# Patient Record
Sex: FEMALE | Race: WHITE | NOT HISPANIC OR LATINO | Employment: OTHER | URBAN - METROPOLITAN AREA
[De-identification: names, ages, dates, MRNs, and addresses within clinical notes are randomized per-mention and may not be internally consistent; named-entity substitution may affect disease eponyms.]

---

## 2018-07-06 LAB — MAMMOGRAM: NORMAL

## 2018-07-11 ENCOUNTER — TRANSFERRED RECORDS (OUTPATIENT)
Dept: HEALTH INFORMATION MANAGEMENT | Facility: CLINIC | Age: 63
End: 2018-07-11

## 2018-07-12 ENCOUNTER — TRANSFERRED RECORDS (OUTPATIENT)
Dept: HEALTH INFORMATION MANAGEMENT | Facility: CLINIC | Age: 63
End: 2018-07-12

## 2018-08-06 ENCOUNTER — TRANSFERRED RECORDS (OUTPATIENT)
Dept: HEALTH INFORMATION MANAGEMENT | Facility: CLINIC | Age: 63
End: 2018-08-06

## 2018-08-13 ENCOUNTER — PRE VISIT (OUTPATIENT)
Dept: OTOLARYNGOLOGY | Facility: CLINIC | Age: 63
End: 2018-08-13

## 2018-08-13 NOTE — TELEPHONE ENCOUNTER
Date of appointment: 8/29/18   Diagnosis/reason for appointment: [facility]  Referring provider/facility:Dr. Daniel Saavedra  Who called: Patient    Recent Studies  Imaging:  Pathology:  Labs:  Previous chemo/radiation (if known):    Records requested from: [facility]-Spoke with Kimmy- She will have images pushed to PACS and forward request to pathology for slides    Records received from:    Additional information:   No

## 2018-08-16 LAB — COPATH REPORT: NORMAL

## 2018-08-16 PROCEDURE — 00000346 ZZHCL STATISTIC REVIEW OUTSIDE SLIDES TC 88321: Performed by: OTOLARYNGOLOGY

## 2018-08-20 ENCOUNTER — HEALTH MAINTENANCE LETTER (OUTPATIENT)
Age: 63
End: 2018-08-20

## 2018-08-29 ENCOUNTER — OFFICE VISIT (OUTPATIENT)
Dept: OTOLARYNGOLOGY | Facility: CLINIC | Age: 63
End: 2018-08-29
Payer: COMMERCIAL

## 2018-08-29 VITALS — BODY MASS INDEX: 36.73 KG/M2 | HEIGHT: 67 IN | WEIGHT: 234 LBS

## 2018-08-29 DIAGNOSIS — E07.9 THYROID MASS: Primary | ICD-10-CM

## 2018-08-29 RX ORDER — SENNOSIDES 8.6 MG
650 CAPSULE ORAL
COMMUNITY
End: 2018-10-02

## 2018-08-29 RX ORDER — LANOLIN ALCOHOL/MO/W.PET/CERES
1000 CREAM (GRAM) TOPICAL
COMMUNITY
Start: 2016-03-21

## 2018-08-29 RX ORDER — GABAPENTIN 600 MG/1
TABLET ORAL
COMMUNITY
Start: 2018-03-08 | End: 2020-04-15

## 2018-08-29 RX ORDER — IPRATROPIUM BROMIDE 42 UG/1
2 SPRAY, METERED NASAL
COMMUNITY
Start: 2017-12-13 | End: 2020-05-19

## 2018-08-29 RX ORDER — ROPINIROLE 0.25 MG/1
0.25 TABLET, FILM COATED ORAL AT BEDTIME
COMMUNITY

## 2018-08-29 RX ORDER — FLUTICASONE PROPIONATE 50 MCG
SPRAY, SUSPENSION (ML) NASAL
Refills: 11 | COMMUNITY
Start: 2018-03-09

## 2018-08-29 RX ORDER — DULOXETIN HYDROCHLORIDE 60 MG/1
60 CAPSULE, DELAYED RELEASE ORAL DAILY
COMMUNITY
Start: 2018-04-27

## 2018-08-29 RX ORDER — BACLOFEN 10 MG/1
TABLET ORAL PRN
Refills: 0 | COMMUNITY
Start: 2018-06-27 | End: 2019-07-23

## 2018-08-29 ASSESSMENT — PAIN SCALES - GENERAL: PAINLEVEL: NO PAIN (0)

## 2018-08-29 NOTE — NURSING NOTE
Chief Complaint   Patient presents with     Consult     thyroid referral by Dr. Quentin Nunes, EMT
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
## 2018-08-29 NOTE — PATIENT INSTRUCTIONS
Plan of care:  You can expect a call from Pamella to discuss your surgery  Clinic contact information:  1. To schedule an appointment or to speak to someone regarding your medical care, please call [phone], option #1  2. GLORIA Eden: [phone]  3. Surgery scheduling:      María Ballesteros: [phone]      Arcelia Luz: [phone]  4. Fax: [phone]  5. [phone]

## 2018-08-29 NOTE — MR AVS SNAPSHOT
After Visit Summary   2018    Joana Santana    MRN: [id number]           Patient Information     Date Of Birth          1955        Visit Information        Provider Department      2018 3:20 PM Destini Woodson MD [facility] Ear Nose and Throat        Today's Diagnoses     Thyroid mass    -  1      Care Instructions    Plan of care:  You can expect a call from Pamella to discuss your surgery  Clinic contact information:  1. To schedule an appointment or to speak to someone regarding your medical care, please call [phone], option #1  2. KareyRN: [phone]  3. Surgery scheduling:      María Ballesteros: [phone]      Arcelia Luz: [phone]  4. Fax: [phone]  5. [phone]            Follow-ups after your visit        Who to contact     Please call your clinic at [phone] to:    Ask questions about your health    Make or cancel appointments    Discuss your medicines    Learn about your test results    Speak to your doctor            Additional Information About Your Visit        [employer] Information     [employer] gives you secure access to your electronic health record. If you see a primary care provider, you can also send messages to your care team and make appointments. If you have questions, please call your primary care clinic.  If you do not have a primary care provider, please call [phone] and they will assist you.      [employer] is an electronic gateway that provides easy, online access to your medical records. With [employer], you can request a clinic appointment, read your test results, renew a prescription or communicate with your care team.     To access your existing account, please contact your [facility] or call [phone] for assistance.        Care EveryWhere ID     This is your Care EveryWhere ID. This could be used by other organizations to access your [facility] medical records  [id number]        Your  "Vitals Were     Height BMI (Body Mass Index)                1.702 m (5' 7\") 36.65 kg/m2           Blood Pressure from Last 3 Encounters:   No data found for BP    Weight from Last 3 Encounters:   08/29/18 106.1 kg (234 lb)              We Performed the Following     IMAGESTREAM RECORDING ORDER        Primary Care Provider    None Specified       No primary provider on file.        Equal Access to Services     [facility]: Hadii aad ku hadasho Soomaali, waaxda luqadaha, qaybta kaalmada adeegyada, sami gomez elíasn adecandi roa lajessyn . [facility] [phone].    ATENCIÓN: Si habla español, tiene a mckeon disposición servicios gratuitos de asistencia lingüística. Javierame al [phone].    We comply with applicable federal civil rights laws and [place] laws. We do not discriminate on the basis of race, color, national origin, age, disability, sex, sexual orientation, or gender identity.            Thank you!     Thank you for choosing [facility] EAR [facility]SE [facility] THROAT  for your care. Our goal is always to provide you with excellent care. Hearing back from our patients is one way we can continue to improve our services. Please take a few minutes to complete the written survey that you may receive in the mail after your visit with us. Thank you!             Your Updated Medication List - Protect others around you: Learn how to safely use, store and throw away your medicines at www.disposemymeds.org.          This list is accurate as of 8/29/18  4:48 PM.  Always use your most recent med list.                   Brand Name Dispense Instructions for use Diagnosis    acetaminophen 650 MG CR tablet    TYLENOL     650 mg        baclofen 10 MG tablet    LIORESAL          cholecalciferol 1000 UNIT tablet    vitamin D3     1,000 Units        cyanocobalamin 1000 MCG tablet    vitamin  B-12     1,000 mcg        DULoxetine 60 MG EC capsule    CYMBALTA     Pt taking 120 mg cymbalta hs        fluticasone 50 MCG/ACT spray    FLONASE     " SPRAY 2 SPRAYS INTO EACH NOSTRIL 1 TIME PER DAY @ 6 PM        gabapentin 600 MG tablet    NEURONTIN     Take 600 mg every morning and 1200 mg q HS, Disp # QS x 1 month        ipratropium 0.06 % spray    ATROVENT     2 sprays        medical cannabis (Patient's own supply.  Not a prescription)      PRN per parameter Use oil morning and evening. Spray 2-4 sprays as needed.        rOPINIRole 0.25 MG tablet    REQUIP     0.25 mg
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
## 2018-09-10 ENCOUNTER — OFFICE VISIT (OUTPATIENT)
Dept: OTOLARYNGOLOGY | Facility: CLINIC | Age: 63
End: 2018-09-10
Payer: COMMERCIAL

## 2018-09-10 VITALS — WEIGHT: 234 LBS | BODY MASS INDEX: 36.73 KG/M2 | HEIGHT: 67 IN

## 2018-09-10 DIAGNOSIS — E04.1 THYROID NODULE: Primary | ICD-10-CM

## 2018-09-10 ASSESSMENT — PAIN SCALES - GENERAL: PAINLEVEL: NO PAIN (0)

## 2018-09-10 NOTE — PATIENT INSTRUCTIONS
Thank you for being seen in the clinic by Dr. Wasserman.  We will schedule your upcoming surgery.  Please call the ENT clinic with questions or concerns.  Thanks!    Carson Callahan RN

## 2018-09-10 NOTE — NURSING NOTE
Chief Complaint   Patient presents with     Consult     new thyroid nodule to discuss possible surgery.      Anuj Nunes, EMT

## 2018-09-10 NOTE — MR AVS SNAPSHOT
After Visit Summary   9/10/2018    Joana Santana    MRN: [id number]           Patient Information     Date Of Birth          1955        Visit Information        Provider Department      9/10/2018 1:45 PM Breanna Wasserman MD [facility]        Today's Diagnoses     Thyroid nodule    -  1      Care Instructions    Thank you for being seen in the clinic by Dr. Wasserman.  We will schedule your upcoming surgery.  Please call the ENT clinic with questions or concerns.  Thanks!    Carson Callahan RN            Follow-ups after your visit        Your next 10 appointments already scheduled     Oct 02, 2018   Procedure with Breanna Wasserman MD   [facility] ([facility])    [address] [place] [address]  5th [facility] [address] [phone]           Located in the Clinics and Surgery Center at [address] [place] [address].   parking is very convenient and highly recommended.  is a $6 flat rate fee.  Both  and self parkers should enter the main arrival plaza from [facility]; parking attendants will direct you based on your parking preference.            Oct 25, 2018  1:00 PM CDT   Return Visit with Breanna Wasserman MD   [facility] ([facility])    [address] [place] [address] [phone]              Who to contact     Please call your clinic at [phone] to:    Ask questions about your health    Make or cancel appointments    Discuss your medicines    Learn about your test results    Speak to your doctor            Additional Information About Your Visit        [employer] Information     [employer] gives you secure access to your electronic health record. If you see a primary care provider, you can also send messages to your care team and make appointments. If you have questions, please call your primary care clinic.  If you do not have  "a primary care provider, please call [phone] and they will assist you.      [email or web address] is an electronic gateway that provides easy, online access to your medical records. With [email or web address], you can request a clinic appointment, read your test results, renew a prescription or communicate with your care team.     To access your existing account, please contact your [facility] or call [phone] for assistance.        Care EveryWhere ID     This is your Care EveryWhere ID. This could be used by other organizations to access your [facility] medical records  [id number]        Your Vitals Were     Height BMI (Body Mass Index)                1.702 m (5' 7\") 36.65 kg/m2           Blood Pressure from Last 3 Encounters:   No data found for BP    Weight from Last 3 Encounters:   09/10/18 106.1 kg (234 lb)   08/29/18 106.1 kg (234 lb)              We Performed the Following     [facility]-Operative Worksheet        Primary Care Provider    None Specified       [address] [place] [id number]        Equal Access to Services     [facility] VistaHARRY : Hadii aad ku hadasho Soomaali, waaxda luqadaha, qaybta kaalmada adeegyada, waxay idiin hayaan adeeg kharaarchie henry . So [facility] [phone].    ATENCIÓN: Si habla español, tiene a mckeon disposición servicios gratuitos de asistencia lingüística. Ll[facility] [phone].    We comply with applicable federal civil rights laws and [place] laws. We do not discriminate on the basis of race, color, national origin, age, disability, sex, sexual orientation, or gender identity.            Thank you!     Thank you for choosing [facility]  for your care. Our goal is always to provide you with excellent care. Hearing back from our patients is one way we can continue to improve our services. Please take a few minutes to complete the written survey that you may receive in the mail after your visit with us. Thank you!             Your Updated Medication List - " Protect others around you: Learn how to safely use, store and throw away your medicines at www.disposemymeds.org.          This list is accurate as of 9/10/18 11:59 PM.  Always use your most recent med list.                   Brand Name Dispense Instructions for use Diagnosis    acetaminophen 650 MG CR tablet    TYLENOL     650 mg        baclofen 10 MG tablet    LIORESAL          cholecalciferol 1000 UNIT tablet    vitamin D3     1,000 Units        cyanocobalamin 1000 MCG tablet    vitamin  B-12     1,000 mcg        DULoxetine 60 MG EC capsule    CYMBALTA     Pt taking 120 mg cymbalta hs        fluticasone 50 MCG/ACT spray    FLONASE     SPRAY 2 SPRAYS INTO EACH NOSTRIL 1 TIME PER DAY @ 6 PM        gabapentin 600 MG tablet    NEURONTIN     Take 600 mg every morning and 1200 mg q HS, Disp # QS x 1 month        ipratropium 0.06 % spray    ATROVENT     2 sprays        medical cannabis (Patient's own supply.  Not a prescription)      PRN per parameter Use oil morning and evening. Spray 2-4 sprays as needed.        rOPINIRole 0.25 MG tablet    REQUIP     0.25 mg

## 2018-09-10 NOTE — LETTER
9/10/2018       RE: Joana Santana  [address] [place] [address]     Dear Colleague,    Thank you for referring your patient, Joana Santana, to the [facility] at [facility]. Please see a copy of my visit note below.    Service Date: 09/10/2018      FIRST-TIME CONSULTATION CLINIC NOTE      30 minutes was spent in the care and consultation of the patient to discuss surgical options for thyroid nodules.      HISTORY OF PRESENT ILLNESS:  This is a 63-year-old female who was undergoing an MRI who was noted to have a 1.5 cm thyroid nodule.  This then led to an ultrasound that characterized this nodule now at 2.3 cm heterogeneously, hypoechoic 2.0 x 1.9 x 2.3 cm solid nodule.  This then led to a fine-needle aspiration of the thyroid nodule that was suspicious for Hurthle cell neoplasm.  The right lobe of the thyroid gland there was a 1.2 x 0.9 x 1.2 cm thyroid nodule.  Fine needle aspiration again suspicious for Hurthle cell neoplasm.  This was reviewed at our institution and concurred with the suspicious for follicular neoplasm, Hurthle cell type.      Regarding the thyroid nodule, the patient denies any symptoms of hypo or hyperthyroidism.  She has no problems with voice quality, inspiration or swallowing.  She has no previous family history of thyroid carcinoma.  No previous head and neck radiation exposure.      PAST MEDICAL HISTORY, SURGICAL HISTORY AND MEDICATIONS:  Have been reviewed and are available in the EMR.      REVIEW OF SYSTEMS:  A 10-point review of systems is pertinent for that noted in the HPI.      PHYSICAL EXAMINATION:  Her neck is soft.  The left thyroid nodule is palpable.  I do not feel the right thyroid nodule.  There is no obvious cervical lymphadenopathy.        The laboratory data and radiographic images are discussed above.      ASSESSMENT:  Left thyroid Hurthle cell nodule.      PLAN:  I discussed with the patient that a  left thyroid lobectomy and isthmusectomy is likely how we would proceed.  However, there is a small solid nodule in the right lobe measuring 1.2 cm.  I would do a left thyroid lobectomy and isthmusectomy first followed by frozen sections.  If I see that I am concerned about the nodule on the right, then do a completion thyroidectomy.  So, the consent will read left thyroid lobectomy and isthmusectomy, possible total.  This will be done as an outpatient procedure.  I discussed the procedure with the patient, including but not limited to the risks of bleeding, infection, injury to the recurrent laryngeal nerve or nerves, potential permanent hypocalcemia or false negative versus false positive frozen section pathology results.  The patient is aware of this and would like to proceed with surgery and we will schedule her accordingly.         CHAD HANKINS MD             D: 2018   T: 2018   MT: diane      Name:     DANNY POMPA   MRN:      [id number]        Account:      [id number]   :      1955           Service Date: 09/10/2018      Document: [id number]

## 2018-09-11 ENCOUNTER — DOCUMENTATION ONLY (OUTPATIENT)
Dept: OTOLARYNGOLOGY | Facility: CLINIC | Age: 63
End: 2018-09-11

## 2018-09-11 NOTE — PROGRESS NOTES
Patient is scheduled for surgery with Dr. Wasserman      Spoke or left message with: patient while in clinic    Date of Surgery: 10/2/18    Location: [facility]    H&P: Scheduled with [facility] Manny MCDONALD    Post-Op  10/25/18  1:00  [facility]    Surgery packet: given at clinic       **Pt has spinal cord stimulator for pain - implanted by Dr Goff  [phone]                                                [place] [place]  [phone]        María Ballesteros   ENT [facility]-Op Coordinator  [phone]

## 2018-09-17 NOTE — PROGRESS NOTES
Service Date: 09/10/2018      FIRST-TIME CONSULTATION CLINIC NOTE      30 minutes was spent in the care and consultation of the patient to discuss surgical options for thyroid nodules.      HISTORY OF PRESENT ILLNESS:  This is a 63-year-old female who was undergoing an MRI who was noted to have a 1.5 cm thyroid nodule.  This then led to an ultrasound that characterized this nodule now at 2.3 cm heterogeneously, hypoechoic 2.0 x 1.9 x 2.3 cm solid nodule.  This then led to a fine-needle aspiration of the thyroid nodule that was suspicious for Hurthle cell neoplasm.  The right lobe of the thyroid gland there was a 1.2 x 0.9 x 1.2 cm thyroid nodule.  Fine needle aspiration again suspicious for Hurthle cell neoplasm.  This was reviewed at our institution and concurred with the suspicious for follicular neoplasm, Hurthle cell type.      Regarding the thyroid nodule, the patient denies any symptoms of hypo or hyperthyroidism.  She has no problems with voice quality, inspiration or swallowing.  She has no previous family history of thyroid carcinoma.  No previous head and neck radiation exposure.      PAST MEDICAL HISTORY, SURGICAL HISTORY AND MEDICATIONS:  Have been reviewed and are available in the EMR.      REVIEW OF SYSTEMS:  A 10-point review of systems is pertinent for that noted in the HPI.      PHYSICAL EXAMINATION:  Her neck is soft.  The left thyroid nodule is palpable.  I do not feel the right thyroid nodule.  There is no obvious cervical lymphadenopathy.        The laboratory data and radiographic images are discussed above.      ASSESSMENT:  Left thyroid Hurthle cell nodule.      PLAN:  I discussed with the patient that a left thyroid lobectomy and isthmusectomy is likely how we would proceed.  However, there is a small solid nodule in the right lobe measuring 1.2 cm.  I would do a left thyroid lobectomy and isthmusectomy first followed by frozen sections.  If I see that I am concerned about the nodule on the  right, then do a completion thyroidectomy.  So, the consent will read left thyroid lobectomy and isthmusectomy, possible total.  This will be done as an outpatient procedure.  I discussed the procedure with the patient, including but not limited to the risks of bleeding, infection, injury to the recurrent laryngeal nerve or nerves, potential permanent hypocalcemia or false negative versus false positive frozen section pathology results.  The patient is aware of this and would like to proceed with surgery and we will schedule her accordingly.         CHAD HANKINS MD             D: 2018   T: 2018   MT: diane      Name:     DANNY POMPA   MRN:      [id number]        Account:      [id number]   :      1955           Service Date: 09/10/2018      Document: [id number]

## 2018-10-01 ENCOUNTER — ANESTHESIA EVENT (OUTPATIENT)
Dept: SURGERY | Facility: AMBULATORY SURGERY CENTER | Age: 63
End: 2018-10-01

## 2018-10-02 ENCOUNTER — SURGERY (OUTPATIENT)
Age: 63
End: 2018-10-02

## 2018-10-02 ENCOUNTER — ANESTHESIA (OUTPATIENT)
Dept: SURGERY | Facility: AMBULATORY SURGERY CENTER | Age: 63
End: 2018-10-02

## 2018-10-02 ENCOUNTER — HOSPITAL ENCOUNTER (OUTPATIENT)
Facility: AMBULATORY SURGERY CENTER | Age: 63
End: 2018-10-02
Attending: SURGERY
Payer: COMMERCIAL

## 2018-10-02 VITALS
RESPIRATION RATE: 20 BRPM | HEART RATE: 87 BPM | BODY MASS INDEX: 36.88 KG/M2 | TEMPERATURE: 98.4 F | SYSTOLIC BLOOD PRESSURE: 122 MMHG | OXYGEN SATURATION: 95 % | HEIGHT: 67 IN | DIASTOLIC BLOOD PRESSURE: 67 MMHG | WEIGHT: 235 LBS

## 2018-10-02 DIAGNOSIS — Z98.890 POSTOPERATIVE STATE: Primary | ICD-10-CM

## 2018-10-02 RX ORDER — NALOXONE HYDROCHLORIDE 0.4 MG/ML
.1-.4 INJECTION, SOLUTION INTRAMUSCULAR; INTRAVENOUS; SUBCUTANEOUS
Status: DISCONTINUED | OUTPATIENT
Start: 2018-10-02 | End: 2018-10-03 | Stop reason: HOSPADM

## 2018-10-02 RX ORDER — ASPIRIN 81 MG
100 TABLET, DELAYED RELEASE (ENTERIC COATED) ORAL 2 TIMES DAILY PRN
Qty: 30 TABLET | Refills: 1 | Status: SHIPPED | OUTPATIENT
Start: 2018-10-02 | End: 2018-10-25

## 2018-10-02 RX ORDER — OXYCODONE HYDROCHLORIDE 5 MG/1
5 TABLET ORAL EVERY 4 HOURS PRN
Status: DISCONTINUED | OUTPATIENT
Start: 2018-10-02 | End: 2018-10-03 | Stop reason: HOSPADM

## 2018-10-02 RX ORDER — ACETAMINOPHEN 325 MG/1
975 TABLET ORAL ONCE
Status: COMPLETED | OUTPATIENT
Start: 2018-10-02 | End: 2018-10-02

## 2018-10-02 RX ORDER — FENTANYL CITRATE 50 UG/ML
25-50 INJECTION, SOLUTION INTRAMUSCULAR; INTRAVENOUS
Status: DISCONTINUED | OUTPATIENT
Start: 2018-10-02 | End: 2018-10-02 | Stop reason: HOSPADM

## 2018-10-02 RX ORDER — PROPOFOL 10 MG/ML
INJECTION, EMULSION INTRAVENOUS PRN
Status: DISCONTINUED | OUTPATIENT
Start: 2018-10-02 | End: 2018-10-02

## 2018-10-02 RX ORDER — ONDANSETRON 4 MG/1
4 TABLET, ORALLY DISINTEGRATING ORAL EVERY 30 MIN PRN
Status: DISCONTINUED | OUTPATIENT
Start: 2018-10-02 | End: 2018-10-03 | Stop reason: HOSPADM

## 2018-10-02 RX ORDER — EPHEDRINE SULFATE 50 MG/ML
INJECTION, SOLUTION INTRAMUSCULAR; INTRAVENOUS; SUBCUTANEOUS PRN
Status: DISCONTINUED | OUTPATIENT
Start: 2018-10-02 | End: 2018-10-02

## 2018-10-02 RX ORDER — LIDOCAINE 40 MG/G
CREAM TOPICAL
Status: DISCONTINUED | OUTPATIENT
Start: 2018-10-02 | End: 2018-10-02 | Stop reason: HOSPADM

## 2018-10-02 RX ORDER — HYDROCODONE BITARTRATE AND ACETAMINOPHEN 5; 325 MG/1; MG/1
1-2 TABLET ORAL EVERY 4 HOURS PRN
Qty: 20 TABLET | Refills: 0 | Status: SHIPPED | OUTPATIENT
Start: 2018-10-02 | End: 2018-10-25

## 2018-10-02 RX ORDER — ONDANSETRON 2 MG/ML
INJECTION INTRAMUSCULAR; INTRAVENOUS PRN
Status: DISCONTINUED | OUTPATIENT
Start: 2018-10-02 | End: 2018-10-02

## 2018-10-02 RX ORDER — GABAPENTIN 300 MG/1
300 CAPSULE ORAL ONCE
Status: COMPLETED | OUTPATIENT
Start: 2018-10-02 | End: 2018-10-02

## 2018-10-02 RX ORDER — SODIUM CHLORIDE, SODIUM LACTATE, POTASSIUM CHLORIDE, CALCIUM CHLORIDE 600; 310; 30; 20 MG/100ML; MG/100ML; MG/100ML; MG/100ML
INJECTION, SOLUTION INTRAVENOUS CONTINUOUS
Status: DISCONTINUED | OUTPATIENT
Start: 2018-10-02 | End: 2018-10-02 | Stop reason: HOSPADM

## 2018-10-02 RX ORDER — ONDANSETRON 2 MG/ML
4 INJECTION INTRAMUSCULAR; INTRAVENOUS EVERY 30 MIN PRN
Status: DISCONTINUED | OUTPATIENT
Start: 2018-10-02 | End: 2018-10-03 | Stop reason: HOSPADM

## 2018-10-02 RX ORDER — SODIUM CHLORIDE, SODIUM LACTATE, POTASSIUM CHLORIDE, CALCIUM CHLORIDE 600; 310; 30; 20 MG/100ML; MG/100ML; MG/100ML; MG/100ML
INJECTION, SOLUTION INTRAVENOUS CONTINUOUS
Status: DISCONTINUED | OUTPATIENT
Start: 2018-10-02 | End: 2018-10-03 | Stop reason: HOSPADM

## 2018-10-02 RX ORDER — MEPERIDINE HYDROCHLORIDE 25 MG/ML
12.5 INJECTION INTRAMUSCULAR; INTRAVENOUS; SUBCUTANEOUS
Status: DISCONTINUED | OUTPATIENT
Start: 2018-10-02 | End: 2018-10-03 | Stop reason: HOSPADM

## 2018-10-02 RX ORDER — DEXAMETHASONE SODIUM PHOSPHATE 4 MG/ML
INJECTION, SOLUTION INTRA-ARTICULAR; INTRALESIONAL; INTRAMUSCULAR; INTRAVENOUS; SOFT TISSUE PRN
Status: DISCONTINUED | OUTPATIENT
Start: 2018-10-02 | End: 2018-10-02

## 2018-10-02 RX ORDER — HYDROMORPHONE HYDROCHLORIDE 1 MG/ML
.3-.5 INJECTION, SOLUTION INTRAMUSCULAR; INTRAVENOUS; SUBCUTANEOUS EVERY 10 MIN PRN
Status: DISCONTINUED | OUTPATIENT
Start: 2018-10-02 | End: 2018-10-03 | Stop reason: HOSPADM

## 2018-10-02 RX ORDER — LIDOCAINE HYDROCHLORIDE 10 MG/ML
INJECTION, SOLUTION INFILTRATION; PERINEURAL PRN
Status: DISCONTINUED | OUTPATIENT
Start: 2018-10-02 | End: 2018-10-02

## 2018-10-02 RX ORDER — PROPOFOL 10 MG/ML
INJECTION, EMULSION INTRAVENOUS CONTINUOUS PRN
Status: DISCONTINUED | OUTPATIENT
Start: 2018-10-02 | End: 2018-10-02

## 2018-10-02 RX ADMIN — Medication 0.2 ML: at 07:00

## 2018-10-02 RX ADMIN — DEXAMETHASONE SODIUM PHOSPHATE 10 MG: 4 INJECTION, SOLUTION INTRA-ARTICULAR; INTRALESIONAL; INTRAMUSCULAR; INTRAVENOUS; SOFT TISSUE at 08:47

## 2018-10-02 RX ADMIN — Medication 0.5 MG: at 09:32

## 2018-10-02 RX ADMIN — SODIUM CHLORIDE, SODIUM LACTATE, POTASSIUM CHLORIDE, CALCIUM CHLORIDE: 600; 310; 30; 20 INJECTION, SOLUTION INTRAVENOUS at 06:54

## 2018-10-02 RX ADMIN — ACETAMINOPHEN 975 MG: 325 TABLET ORAL at 06:54

## 2018-10-02 RX ADMIN — LIDOCAINE HYDROCHLORIDE 80 MG: 10 INJECTION, SOLUTION INFILTRATION; PERINEURAL at 08:42

## 2018-10-02 RX ADMIN — EPHEDRINE SULFATE 5 MG: 50 INJECTION, SOLUTION INTRAMUSCULAR; INTRAVENOUS; SUBCUTANEOUS at 08:56

## 2018-10-02 RX ADMIN — GABAPENTIN 300 MG: 300 CAPSULE ORAL at 06:54

## 2018-10-02 RX ADMIN — PROPOFOL 100 MCG/KG/MIN: 10 INJECTION, EMULSION INTRAVENOUS at 08:42

## 2018-10-02 RX ADMIN — SODIUM CHLORIDE, SODIUM LACTATE, POTASSIUM CHLORIDE, CALCIUM CHLORIDE: 600; 310; 30; 20 INJECTION, SOLUTION INTRAVENOUS at 08:37

## 2018-10-02 RX ADMIN — EPHEDRINE SULFATE 10 MG: 50 INJECTION, SOLUTION INTRAMUSCULAR; INTRAVENOUS; SUBCUTANEOUS at 09:24

## 2018-10-02 RX ADMIN — OXYCODONE HYDROCHLORIDE 5 MG: 5 TABLET ORAL at 11:02

## 2018-10-02 RX ADMIN — PROPOFOL 160 MG: 10 INJECTION, EMULSION INTRAVENOUS at 08:42

## 2018-10-02 RX ADMIN — ONDANSETRON 4 MG: 2 INJECTION INTRAMUSCULAR; INTRAVENOUS at 08:47

## 2018-10-02 ASSESSMENT — LIFESTYLE VARIABLES: TOBACCO_USE: 0

## 2018-10-02 NOTE — OP NOTE
Procedure Date:  10/02/18       SURGEON:  Breanna Wasserman MD       RESIDENT SURGEON:  Maddie Edwards MD      PREOPERATIVE DIAGNOSIS:    1. Thyroid nodules       POSTOPERATIVE DIAGNOSIS:    1. Thyroid nodules       PROCEDURE PERFORMED:  Left thyroid lobectomy and isthmusectomy with 90 Minutes intraoperative recurrent laryngeal nerve monitoring      INDICATIONS FOR PROCEDURE: Joana Santana is a 63 year old female with a history of an incidentally found left thyroid nodule with ultrasound revealing a 2.0 x 1.9 x 2.3 cm left nodule as well as a right nodule (1.2 x 0.9 x 1.2 cm). The left thyroid nodule underwent FNA and that was suspicious for Hurthle cell neoplasm. Due to this finding it was recommended that she undergo the above mentioned procedure and she provided consent after explanation of the risks, benefits and alternatives.       FINDINGS:   1.  A large thyroid nodule centered in the left.  2.  Left nodule was biopsied and this was consistent with a cyst.  3.  Otherwise normal anterior neck anatomy.   4.  The left recurrent laryngeal nerve was identified and preserved.  This stimulated at 0.8 mA at the conclusion of the case.       ANESTHESIA:  General endotracheal.       ESTIMATED BLOOD LOSS:  10 mL        COMPLICATIONS:  None apparent.       CONDITION:  Stable to PACU.       DESCRIPTION OF PROCEDURE:  The patient was met in the preoperative area, where informed consent was obtained.  She was then brought back to the operating room, transferred to the operating table and laid in the supine position.  General anesthesia was induced, and she was intubated with an endotracheal tube with placement of the NIM sensors at the vocal folds.  The NIM monitor was found to be working appropriately.  The patient was then prepped and draped in the usual sterile fashion.  An institutional time-out was performed to verify the correct patient, procedure and site, and all present were in agreement.  Our incision was  marked in a transverse neck rhytid.  The skin was incised with a 15 blade in the dermis and immediate subcutaneous tissue dissected down to the strap musculature with cautery.  The straps were divided at the midline raphae and lateralized.  We then identified the thyroid isthmus and carried our dissection deep to the straps.  We then turned our attention to the inferior left pole, dividing the inferior thyroid artery, taking care to preserve the vascular supply to the inferior parathyroid.  This was divided with clips and suture ligatures.  We then began to medialize the lateral aspect of the thyroid gland.  We continued our dissection superiorly to mobilize the superior pole, taking care to avoid the superior laryngeal neurovascular bundle. The superior and inferior parathyroid glands were identified and allowed to retract away from the thyroid gland. The dissection was then carried medially, and the thyroid lobe and isthmus were elevated off the trachea with the bovie.  The isthmus was then divided with a LigaSure.  The specimen was handed off the field. The left recurrent laryngeal nerve was identified and stimulated at 0.8 mA. The wound was then gently irrigated and inspected for hemostasis, which was achieved with bipolar cautery.  A Valsalva maneuver was performed. Next, the right thyroid nodule was bluntly dissected from the surrounding tissue and biopsied. Fibrillar was placed in the resection cavity.  The straps were then loosely reapproximated with 3-0 chromic, and the platysma was reapproximated with 3-0 chromic.  The skin was closed with a 4-0 Monocryl in a subcuticular fashion and closed with the skin glue.  This concluded our portion of procedure.  Care of the patient was turned to Anesthesia, who awoke and extubated her uneventfully.  She was transferred to the PACU in stable condition. All counts were correct at the conclusion of the case.    Dr. Wasserman was present for all portions of the  procedure.     Maddie Edwards   Otolaryngology resident PGY3

## 2018-10-02 NOTE — DISCHARGE INSTRUCTIONS
[facility]  Home Care Following Anesthesia  For 24 hours after surgery:  1. Get plenty of rest.  A responsible adult must stay with you for at least 24 hours after you leave the surgery center.  2. Do not drive or use heavy equipment.  If you have weakness or tingling, don't drive or use heavy equipment until this feeling goes away.   3. Do not drink alcohol.   4. Avoid strenuous or risky activities.  Ask for help when climbing stairs.  5. You may feel lightheaded.  IF so, sit for a few minutes before standing.  Have someone help you get up.   6. If you have nausea (feel sick to your stomach): Drink only clear liquids such as apple juice, ginger ale, broth or 7-Up.  Rest may also help.  Be sure to drink enough fluids.  Move to a regular diet as you feel able.   7. You may have a slight fever.  Call the doctor if your fever is over 100 F (37.7 C) (taken under the tongue) or lasts longer than 24 hours.  8. You may have a dry mouth, a sore throat, muscle aches or trouble sleeping. These should go away after 24 hours.  9. Do not make important or legal decisions.               Tips for taking pain medications  To get the best pain relief possible, remember these points:    Take pain medications as directed, before pain becomes severe.    Pain medication can upset your stomach: taking it with food may help.    Constipation is a common side effect of pain medication. Drink plenty of  fluids.    Eat foods high in fiber. Take a stool softener if recommended by your doctor or pharmacist.    Do not drink alcohol, drive or operate machinery while taking pain medications.    Ask about other ways to control pain, such as with heat, ice or relaxation.    Tylenol/Acetaminophen Consumption  To help encourage the safe use of acetaminophen, the makers of TYLENOL  have lowered the maximum daily dose for single-ingredient Extra Strength TYLENOL  (acetaminophen) products sold in the [place] from 8  pills per day (4,000 mg) to 6 pills per day (3,000 mg). The dosing interval has also changed from 2 pills every 4-6 hours to 2 pills every 6 hours.    If you feel your pain relief is insufficient, you may take Tylenol/Acetaminophen in addition to your narcotic pain medication.     Be careful not to exceed 3,000 mg of Tylenol/Acetaminophen in a 24 hour period from all sources.    If you are taking extra strength Tylenol/acetaminophen (500 mg), the maximum dose is 6 tablets in 24 hours.    If you are taking regular strength acetaminophen (325 mg), the maximum dose is 9 tablets in 24 hours.    Tylenol 975mg given at 7am, next dose available after 1pm. Then follow package instructions.     Call a doctor for any of the followin. Signs of infection (fever, growing tenderness at the surgery site, a large amount of drainage or bleeding, severe pain, foul-smelling drainage, redness, swelling).  2. It has been over 8 to 10 hours since surgery and you are still not able to urinate (pass water).  3. Headache for over 24 hours.  4. Numbness, tingling or weakness the day after surgery (if you had spinal anesthesia).  Your doctor is:  Dr. Breanna Wasserman, ENT Otolaryngology: [phone]                    Or dial [phone] and ask for the resident on call for:  ENT Otolaryngology  For emergency care, call the:  [facility]:  [phone] (TTY for hearing impaired: [phone])

## 2018-10-02 NOTE — IP AVS SNAPSHOT
[facility]    [address] [place] [address]    Phone:  [phone]    Fax:  [phone]                                       After Visit Summary   10/2/2018    Joana Santana    MRN: [id number]           After Visit Summary Signature Page     I have received my discharge instructions, and my questions have been answered. I have discussed any challenges I see with this plan with the nurse or doctor.    ..........................................................................................................................................  Patient/Patient Representative Signature      ..........................................................................................................................................  Patient Representative Print Name and Relationship to Patient    ..................................................               ................................................  Date                                   Time    ..........................................................................................................................................  Reviewed by Signature/Title    ...................................................              ..............................................  Date                                               Time          22EPIC Rev 08/18

## 2018-10-02 NOTE — IP AVS SNAPSHOT
MRN:[id number]                      After Visit Summary   10/2/2018    Joana Santana    MRN: [id number]           Thank you!     Thank you for choosing [facility] for your care. Our goal is always to provide you with excellent care. Hearing back from our patients is one way we can continue to improve our services. Please take a few minutes to complete the written survey that you may receive in the mail after you visit with us. Thank you!        Patient Information     Date Of Birth          1955        About your hospital stay     You were admitted on:  October 2, 2018 [facility]    You were discharged on:  October 2, 2018       Who to Call     For medical emergencies, please call 911.  For non-urgent questions about your medical care, please call your primary care provider or clinic, [phone]  For questions related to your surgery, please call your surgery clinic        Attending Provider     Provider Breanna Puentes MD General Surgery       Primary Care Provider Office Phone # Fax #    Yesi Bryant [facility] [phone]      After Care Instructions     Diet Instructions       Resume pre procedure diet            Discharge Instructions       Patient to follow up with surgeon as previously scheduled.    May start regular diet immediately    No lifting >10 pounds for 6 weeks.  No rigorous physical activity.  No activity restrictions.    May shower now. No bathing for two weeks.    Call [phone] to schedule or with routine questions during the work week.      Call [phone] and ask to speak with surgery resident if you are having troubles in the evenings, at night, or on weekends. Please call if you experience increasing abdominal pain, nausea, vomiting, increasing drainage from your wounds, chills, or fever >101.5    Take stool softener while taking narcotic pain medication.    No driving  for at least 12 hours after taking narcotic pain medication.  Please call Dr. Wasserman with questions or concerns at [phone].            Discharge Instructions - Lifting restrictions       Lifting Restrictions 10 pounds until cleared by Dr. Wasserman            Wound care       Your neck incision is closed with dissolvable sutures and glue. Do not pick off the glue, it will fall off on its own.                  Your next 10 appointments already scheduled     Oct 25, 2018  1:00 PM CDT   Return Visit with Breanna Wasserman MD   [facility]    [address] [place] [address] [phone]              Further instructions from your care team       [facility]  Home Care Following Anesthesia  For 24 hours after surgery:  1. Get plenty of rest.  A responsible adult must stay with you for at least 24 hours after you leave the surgery center.  2. Do not drive or use heavy equipment.  If you have weakness or tingling, don't drive or use heavy equipment until this feeling goes away.   3. Do not drink alcohol.   4. Avoid strenuous or risky activities.  Ask for help when climbing stairs.  5. You may feel lightheaded.  IF so, sit for a few minutes before standing.  Have someone help you get up.   6. If you have nausea (feel sick to your stomach): Drink only clear liquids such as apple juice, ginger ale, broth or 7-Up.  Rest may also help.  Be sure to drink enough fluids.  Move to a regular diet as you feel able.   7. You may have a slight fever.  Call the doctor if your fever is over 100 F (37.7 C) (taken under the tongue) or lasts longer than 24 hours.  8. You may have a dry mouth, a sore throat, muscle aches or trouble sleeping. These should go away after 24 hours.  9. Do not make important or legal decisions.               Tips for taking pain medications  To get the best pain relief possible, remember these  points:    Take pain medications as directed, before pain becomes severe.    Pain medication can upset your stomach: taking it with food may help.    Constipation is a common side effect of pain medication. Drink plenty of  fluids.    Eat foods high in fiber. Take a stool softener if recommended by your doctor or pharmacist.    Do not drink alcohol, drive or operate machinery while taking pain medications.    Ask about other ways to control pain, such as with heat, ice or relaxation.    Tylenol/Acetaminophen Consumption  To help encourage the safe use of acetaminophen, the makers of TYLENOL  have lowered the maximum daily dose for single-ingredient Extra Strength TYLENOL  (acetaminophen) products sold in the [place] from 8 pills per day (4,000 mg) to 6 pills per day (3,000 mg). The dosing interval has also changed from 2 pills every 4-6 hours to 2 pills every 6 hours.    If you feel your pain relief is insufficient, you may take Tylenol/Acetaminophen in addition to your narcotic pain medication.     Be careful not to exceed 3,000 mg of Tylenol/Acetaminophen in a 24 hour period from all sources.    If you are taking extra strength Tylenol/acetaminophen (500 mg), the maximum dose is 6 tablets in 24 hours.    If you are taking regular strength acetaminophen (325 mg), the maximum dose is 9 tablets in 24 hours.    Tylenol 975mg given at 7am, next dose available after 1pm. Then follow package instructions.     Call a doctor for any of the followin. Signs of infection (fever, growing tenderness at the surgery site, a large amount of drainage or bleeding, severe pain, foul-smelling drainage, redness, swelling).  2. It has been over 8 to 10 hours since surgery and you are still not able to urinate (pass water).  3. Headache for over 24 hours.  4. Numbness, tingling or weakness the day after surgery (if you had spinal anesthesia).  Your doctor is:  Dr. Breanna Wasserman, ENT Otolaryngology: [phone]                    Or  "dial [phone] and ask for the resident on call for:  ENT Otolaryngology  For emergency care, call the:  [facility]:  [phone] (TTY for hearing impaired: [phone])                Pending Results     Date and Time Order Name Status Description    10/2/2018 0922 Surgical pathology exam Preliminary             Admission Information     Date & Time Provider Department Dept. Phone    10/2/2018 Breanna Wasserman MD [facility] [phone]      Your Vitals Were     Blood Pressure Pulse Temperature Respirations Height Weight    123/69 87 97.6  F (36.4  C) (Temporal) 16 1.702 m (5' 7\") 106.6 kg (235 lb)    Pulse Oximetry BMI (Body Mass Index)                95% 36.81 kg/m2          MyChart Information     [employer] gives you secure access to your electronic health record. If you see a primary care provider, you can also send messages to your care team and make appointments. If you have questions, please call your primary care clinic.  If you do not have a primary care provider, please call [phone] and they will assist you.      [employer] is an electronic gateway that provides easy, online access to your medical records. With [employer], you can request a clinic appointment, read your test results, renew a prescription or communicate with your care team.     To access your existing account, please contact your [facility] or call [phone] for assistance.        Care EveryWhere ID     This is your Care EveryWhere ID. This could be used by other organizations to access your [place] medical records  [id number]        Equal Access to Services     RODNEY ROTHMAN AH: Hadii damian yao hadasho Soomaali, waaxda luqadaha, qaybta kaalmada adeegyada, waxgoldy idiin hayaan adeeg kharash la'aan . So [facility] [phone].    ATENCIÓN: Si habla español, tiene a mckeon disposición servicios gratuitos de asistencia lingüística. Llame al [phone].    We comply " with applicable federal civil rights laws and [place] laws. We do not discriminate on the basis of race, color, national origin, age, disability, sex, sexual orientation, or gender identity.               Review of your medicines      START taking        Dose / Directions    docusate sodium 100 MG tablet   Commonly known as:  COLACE   Used for:  Postoperative state        Dose:  100 mg   Take 100 mg by mouth 2 times daily as needed for constipation   Quantity:  30 tablet   Refills:  1       HYDROcodone-acetaminophen 5-325 MG per tablet   Commonly known as:  NORCO   Used for:  Postoperative state        Dose:  1-2 tablet   Take 1-2 tablets by mouth every 4 hours as needed (Moderate to Severe Pain)   Quantity:  20 tablet   Refills:  0         CONTINUE these medicines which have NOT CHANGED        Dose / Directions    ALPRAZOLAM PO        Dose:  1 mg   Take 1 mg by mouth nightly as needed for anxiety   Refills:  0       baclofen 10 MG tablet   Commonly known as:  LIORESAL   Indication:  Muscle Spasticity        as needed   Refills:  0       cholecalciferol 1000 UNIT tablet   Commonly known as:  vitamin D3        Dose:  1000 Units   1,000 Units   Refills:  0       cyanocobalamin 1000 MCG tablet   Commonly known as:  vitamin  B-12        Dose:  1000 mcg   1,000 mcg   Refills:  0       DULoxetine 60 MG EC capsule   Commonly known as:  CYMBALTA        Pt taking 120 mg cymbalta hs   Refills:  0       fluticasone 50 MCG/ACT spray   Commonly known as:  FLONASE        SPRAY 2 SPRAYS INTO EACH NOSTRIL 1 TIME PER DAY @ 6 PM   Refills:  11       gabapentin 600 MG tablet   Commonly known as:  NEURONTIN        Take 600 mg every morning and 1200 mg q HS, Disp # QS x 1 month   Refills:  0       ipratropium 0.06 % spray   Commonly known as:  ATROVENT        Dose:  2 spray   2 sprays   Refills:  0       medical cannabis (Patient's own supply.  Not a prescription)        PRN per parameter Use oil morning and evening. Spray 2-4 sprays  as needed.   Refills:  0       rOPINIRole 0.25 MG tablet   Commonly known as:  REQUIP        Dose:  0.25 mg   0.25 mg   Refills:  0         STOP taking     acetaminophen 650 MG CR tablet   Commonly known as:  TYLENOL                Where to get your medicines      These medications were sent to [place], [place] - [address]  [address], [facility] [address]    Hours:  TRANSPLANT PHONE NUMBER [phone] Phone:  [phone]     docusate sodium 100 MG tablet         Some of these will need a paper prescription and others can be bought over the counter. Ask your nurse if you have questions.     Bring a paper prescription for each of these medications     HYDROcodone-acetaminophen 5-325 MG per tablet                Protect others around you: Learn how to safely use, store and throw away your medicines at www.disposemymeds.org.        Information about OPIOIDS     PRESCRIPTION OPIOIDS: WHAT YOU NEED TO KNOW   We gave you an opioid (narcotic) pain medicine. It is important to manage your pain, but opioids are not always the best choice. You should first try all the other options your care team gave you. Take this medicine for as short a time (and as few doses) as possible.    Some activities can increase your pain, such as bandage changes or therapy sessions. It may help to take your pain medicine 30 to 60 minutes before these activities. Reduce your stress by getting enough sleep, working on hobbies you enjoy and practicing relaxation or meditation. Talk to your care team about ways to manage your pain beyond prescription opioids.    These medicines have risks:    DO NOT drive when on new or higher doses of pain medicine. These medicines can affect your alertness and reaction times, and you could be arrested for driving under the influence (DUI). If you need to use opioids long-term, talk to your care team about driving.    DO NOT operate heavy  machinery    DO NOT do any other dangerous activities while taking these medicines.    DO NOT drink any alcohol while taking these medicines.     If the opioid prescribed includes acetaminophen, DO NOT take with any other medicines that contain acetaminophen. Read all labels carefully. Look for the word  acetaminophen  or  Tylenol.  Ask your pharmacist if you have questions or are unsure.    You can get addicted to pain medicines, especially if you have a history of addiction (chemical, alcohol or substance dependence). Talk to your care team about ways to reduce this risk.    All opioids tend to cause constipation. Drink plenty of water and eat foods that have a lot of fiber, such as fruits, vegetables, prune juice, apple juice and high-fiber cereal. Take a laxative (Miralax, milk of magnesia, Colace, Senna) if you don t move your bowels at least every other day. Other side effects include upset stomach, sleepiness, dizziness, throwing up, tolerance (needing more of the medicine to have the same effect), physical dependence and slowed breathing.    Store your pills in a secure place, locked if possible. We will not replace any lost or stolen medicine. If you don t finish your medicine, please throw away (dispose) as directed by your pharmacist. The [place] [employer]ution [employer] has more information about safe disposal: https://www.pca.formerly [facility].[place].us/living-green/managing-unwanted-medications             Medication List: This is a list of all your medications and when to take them. Check marks below indicate your daily home schedule. Keep this list as a reference.      Medications           Morning Afternoon Evening Bedtime As Needed    ALPRAZOLAM PO   Take 1 mg by mouth nightly as needed for anxiety                                baclofen 10 MG tablet   Commonly known as:  LIORESAL   as needed                                cholecalciferol 1000 UNIT tablet   Commonly known as:  vitamin D3   1,000 Units                                 cyanocobalamin 1000 MCG tablet   Commonly known as:  vitamin  B-12   1,000 mcg                                docusate sodium 100 MG tablet   Commonly known as:  COLACE   Take 100 mg by mouth 2 times daily as needed for constipation                                DULoxetine 60 MG EC capsule   Commonly known as:  CYMBALTA   Pt taking 120 mg cymbalta hs                                fluticasone 50 MCG/ACT spray   Commonly known as:  FLONASE   SPRAY 2 SPRAYS INTO EACH NOSTRIL 1 TIME PER DAY @ 6 PM                                gabapentin 600 MG tablet   Commonly known as:  NEURONTIN   Take 600 mg every morning and 1200 mg q HS, Disp # QS x 1 month                                HYDROcodone-acetaminophen 5-325 MG per tablet   Commonly known as:  NORCO   Take 1-2 tablets by mouth every 4 hours as needed (Moderate to Severe Pain)            Oxycodone 5mg given at 11am. Next pain pill may be taken at any time. (there is no tylenol in this med)                       ipratropium 0.06 % spray   Commonly known as:  ATROVENT   2 sprays                                medical cannabis (Patient's own supply.  Not a prescription)   PRN per parameter Use oil morning and evening. Spray 2-4 sprays as needed.                                rOPINIRole 0.25 MG tablet   Commonly known as:  REQUIP   0.25 mg

## 2018-10-02 NOTE — ANESTHESIA CARE TRANSFER NOTE
Patient: Joana Santana    Procedure(s):  Left Thyroid Lobectomy and Isthmusectomy - Wound Class: I-Clean    Diagnosis: Left Thyroid Nodule  Diagnosis Additional Information: No value filed.    Anesthesia Type:   General     Note:  Airway :Room Air  Patient transferred to:PACU  Comments: Patient awake and breathing spont. VSS. No complaints of pain or nausea. Report to RNHandoff Report: Identifed the Patient, Identified the Reponsible Provider, Reviewed the pertinent medical history, Discussed the surgical course, Reviewed Intra-OP anesthesia mangement and issues during anesthesia, Set expectations for post-procedure period and Allowed opportunity for questions and acknowledgement of understanding      Vitals: (Last set prior to Anesthesia Care Transfer)    CRNA VITALS  10/2/2018 0944 - 10/2/2018 1019      10/2/2018             Pulse: 87    SpO2: (!)  88 %    Resp Rate (set): 10                Electronically Signed By: CIERRA Griffin CRNA  October 2, 2018  10:19 AM

## 2018-10-02 NOTE — ANESTHESIA POSTPROCEDURE EVALUATION
Patient: Joana Santana    Procedure(s):  Left Thyroid Lobectomy and Isthmusectomy - Wound Class: I-Clean    Diagnosis:Left Thyroid Nodule  Diagnosis Additional Information: No value filed.    Anesthesia Type:  General    Note:  Anesthesia Post Evaluation    Patient location during evaluation: Phase 2  Patient participation: Able to fully participate in evaluation  Level of consciousness: awake and alert  Pain management: adequate  Airway patency: patent  Cardiovascular status: acceptable  Respiratory status: acceptable  Hydration status: acceptable  PONV: none     Anesthetic complications: None          Last vitals:  Vitals:    10/02/18 1049 10/02/18 1120 10/02/18 1200   BP: 123/69 122/67    Pulse:      Resp: 16 20    Temp: 36.4  C (97.6  F) 36.9  C (98.4  F)    SpO2: 95% 92% 95%         Electronically Signed By: Tino Hooper DO  October 2, 2018  1:10 PM

## 2018-10-02 NOTE — ANESTHESIA PREPROCEDURE EVALUATION
Anesthesia Evaluation     . Pt has had prior anesthetic.     No history of anesthetic complications          ROS/MED HX    ENT/Pulmonary:     (+)sleep apnea, uses CPAP , . .   (-) tobacco use   Neurologic:  - neg neurologic ROS     Cardiovascular:  - neg cardiovascular ROS   (+) ----. : . . . :. . No previous cardiac testing       METS/Exercise Tolerance:  >4 METS   Hematologic:  - neg hematologic  ROS       Musculoskeletal:  - neg musculoskeletal ROS       GI/Hepatic:     (+) liver disease,       Renal/Genitourinary:  - ROS Renal section negative       Endo:  - neg endo ROS       Psychiatric:     (+) psychiatric history anxiety and depression      Infectious Disease:  - neg infectious disease ROS       Malignancy:      - no malignancy   Other:    - neg other ROS                 Physical Exam  Normal systems: cardiovascular, pulmonary and dental    Airway   Mallampati: III  TM distance: >3 FB  Neck ROM: full    Dental     Cardiovascular   Rhythm and rate: regular and normal      Pulmonary    breath sounds clear to auscultation                    Anesthesia Plan      History & Physical Review  History and physical reviewed and following examination; no interval change.    ASA Status:  3 .    NPO Status:  > 8 hours    Plan for General and ETT with Intravenous and Propofol induction. Maintenance will be TIVA.    PONV prophylaxis:  Ondansetron (or other 5HT-3) and Dexamethasone or Solumedrol  Additional equipment: Videolaryngoscope      Postoperative Care  Postoperative pain management:  Multi-modal analgesia and Oral pain medications.      Consents  Anesthetic plan, risks, benefits and alternatives discussed with:  Patient.  Use of blood products discussed: No .   .                          .

## 2018-10-02 NOTE — BRIEF OP NOTE
[facility]    Brief Operative Note    Pre-operative diagnosis: Left Thyroid Nodule  Post-operative diagnosis * No post-op diagnosis entered *  Procedure: Procedure(s):  Left Thyroid Lobectomy and Isthmusectomy - Wound Class: I-Clean  Surgeon: Surgeon(s) and Role:     * Breanna Wasserman MD - Primary  Anesthesia: General   Estimated blood loss: 5cc  Drains: None  Specimens:   ID Type Source Tests Collected by Time Destination   A : Left lobe of thyroid (surgeon broke capsule) Tissue Thyroid, left SURGICAL PATHOLOGY EXAM Breanna Wasserman MD 10/2/2018  9:20 AM    B : right inferior thyroid nodule Tissue Other SURGICAL PATHOLOGY EXAM Breanna Wasserman MD 10/2/2018  9:35 AM      Findings:   Please see full operative report for details. .  Complications: None.  Implants: None.

## 2018-10-03 NOTE — OP NOTE
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      SURGICAL PROCEDURE PERFORMED:  Left thyroid lobectomy and isthmusectomy and biopsy of a right thyroid nodule.  Sixty minutes of intraoperative recurrent laryngeal nerve monitoring.        SURGEON:  Breanna Wasserman MD      ASSISTANT:  Maddie Tamez MD       ANESTHESIA:  General endotracheal with nerve monitoring endotracheal tube.      COMPLICATIONS:  None.      ESTIMATED BLOOD LOSS:  Less than 5 mL.      CLINICAL INDICATIONS FOR THE PROCEDURE:  This is a 63-year-old female noted to have a left thyroid nodule.  Fine needle aspiration was two different times.  First time Hurthle cell lesion, second time suspicious for follicular neoplasm.  Based on this, it was recommended the patient undergo a left thyroid lobectomy and isthmusectomy with intraoperative frozen sections.  We also discussed that she had a right thyroid nodule, and we would evaluate that if needed.  The surgical procedure was discussed with the patient, including, but not limited to, the risk of bleeding, infection, injury to the recurrent laryngeal nerve or nerves, potential permanent hypocalcemia, loss of airway.  The patient is aware of this and agreed to proceed with surgery.  Consent was obtained, and the site was marked.      DETAILS OF THE PROCEDURE:  The patient was brought to the operating room in stable condition and placed on the operating table in a supine position.  After appropriate general anesthesia was obtained, the patient was prepped and draped in a sterile fashion.  A time-out was then performed.  A 4 cm incision was made over the anterior neck following a natural skin crease.  The platysma was then divided, and subplatysmal planes were then created.  The strap muscles were divided at the midline  and retracted laterally.  The left lobe of the thyroid gland was retracted medially.  The superior pole of the left lobe of the thyroid gland was grasped and retracted inferolaterally.  The superior thyroidal vessels on the left were skeletonized, clipped and then divided using the LigaSure.  We then carried our dissection inferolaterally to identify the middle thyroid vein.  This was also skeletonized, clipped and divided.  As we rotated the gland from a lateral to medial manner, we identified the left superior parathyroid gland and dissected this from the undersurface of the thyroid, maintaining its viability.  The left recurrent laryngeal nerve was also identified.  We followed this throughout the course in the neck, dissecting the thyroid gland off it anteriorly.  The left inferior parathyroid gland was then dissected from the undersurface of the thyroid, maintaining its viability.  The left inferior thyroidal vessels were then skeletonized, clipped and divided.  We continued rotating the gland from a lateral to medial manner, dissecting it over the anterior portion of the trachea and to the right of the isthmus.  It was then divided to the right of the isthmus using a LigaSure.  The specimen was sent for frozen section.  As we were waiting for the frozen section to be evaluated, we were easily able to palpate the right thyroid nodule.  We dissected it over the anterior portion of the right lobe of the thyroid inferiorly and then took a small piece of the right thyroid nodule and a portion of the contents of the thyroid nodule.  I should note that this nodule was almost exclusively cystic.  This was also sent for frozen section.  The minimal amount of bleeding noted was controlled using bipolar cautery.  The nodule was then oversewn with a 4-0 Prolene figure-of-eight suture.  Pathology confirmed that there was no evidence of malignancy in either the right or the left thyroid nodule.  Prior to closing, we  confirmed that the left recurrent laryngeal nerve was intact visibly as well as with nerve stimulation.  Fibrillar was then placed in the operative bed.  The strap muscles were then approximated at the midline using a 3-0 chromic interrupted suture.  The platysma was approximated using a 3-0 chromic interrupted suture.  The skin incision was approximated with a 5-0 Monocryl running subcuticular suture.  The wound was dressed with Dermabond.  The patient was then extubated and returned to the recovery room in stable condition.  I was present for the entire surgical procedure.         CHAD HANKINS MD             D: 10/02/2018   T: 10/03/2018   MT: susan      Name:     DANNY POMPA   MRN:      [id number]        Account:        [id number]   :      1955           Procedure Date: 10/02/2018      Document: [id number]

## 2018-10-05 LAB — COPATH REPORT: NORMAL

## 2018-10-09 ENCOUNTER — MYC MEDICAL ADVICE (OUTPATIENT)
Dept: SURGERY | Facility: CLINIC | Age: 63
End: 2018-10-09

## 2018-10-10 NOTE — TELEPHONE ENCOUNTER
Spoke with pt regarding results of pathology. Relayed per Dr. Wasserman results were benign. Pt states understanding and denies any further questions. Karey COLLINS RNCC

## 2018-10-10 NOTE — TELEPHONE ENCOUNTER
[facility] Call Center    Phone Message    May a detailed message be left on voicemail: yes    Reason for Call: Other: Patient requesting results from surgery on 10/02. Please follow up to discuss.     Action Taken: Message routed to:  [facility] ([facility]): Ent

## 2018-10-25 ENCOUNTER — OFFICE VISIT (OUTPATIENT)
Dept: SURGERY | Facility: CLINIC | Age: 63
End: 2018-10-25
Payer: COMMERCIAL

## 2018-10-25 VITALS — OXYGEN SATURATION: 98 % | SYSTOLIC BLOOD PRESSURE: 148 MMHG | HEART RATE: 72 BPM | DIASTOLIC BLOOD PRESSURE: 79 MMHG

## 2018-10-25 DIAGNOSIS — E89.0 S/P PARTIAL THYROIDECTOMY: Primary | ICD-10-CM

## 2018-10-25 PROCEDURE — 99024 POSTOP FOLLOW-UP VISIT: CPT | Performed by: SURGERY

## 2018-10-25 ASSESSMENT — PAIN SCALES - GENERAL: PAINLEVEL: SEVERE PAIN (6)

## 2018-10-25 NOTE — LETTER
10/25/2018         RE: Joana Santana  [address] [place] [address]        Dear Colleague,    Thank you for referring your patient, Joana Santana, to the [facility]. Please see a copy of my visit note below.    2 Week post op visit s/p left thyroid lobectomy and open biopsy right inferior thyroid nodule. Since the surgery patient denies any problem with voice quality, inspiration or swallowing. No sx of hypocalcemia.    PE: Wound is healing well. No evidence of hematoma, seroma or infection.    Pathology reviewed with patient-both nodules benign    Asses:  F/U s/p left thyroid lobectomy    Plan:  Reviewed with patient wound management and massage  Check TFT's at 6 weeks post op  Follow up with PCP    Again, thank you for allowing me to participate in the care of your patient.        Sincerely,        Breanna Wasserman MD

## 2018-10-25 NOTE — MR AVS SNAPSHOT
After Visit Summary   10/25/2018    Joana Santana    MRN: [id number]           Patient Information     Date Of Birth          1955        Visit Information        Provider Department      10/25/2018 1:00 PM Breanna Wasserman MD [facility]        Today's Diagnoses     S/P partial thyroidectomy    -  1       Follow-ups after your visit        Who to contact     If you have questions or need follow up information about today's clinic visit or your schedule please contact [facility] directly at [phone].  Normal or non-critical lab and imaging results will be communicated to you by OUT[employer]ET[employer], letter or phone within 4 business days after the clinic has received the results. If you do not hear from us within 7 days, please contact the clinic through Grid[employer] or phone. If you have a critical or abnormal lab result, we will notify you by phone as soon as possible.  Submit refill requests through [employer] or call your pharmacy and they will forward the refill request to us. Please allow 3 business days for your refill to be completed.          Additional Information About Your Visit        MyChart Information     [employer] gives you secure access to your electronic health record. If you see a primary care provider, you can also send messages to your care team and make appointments. If you have questions, please call your primary care clinic.  If you do not have a primary care provider, please call [phone] and they will assist you.      [employer] is an electronic gateway that provides easy, online access to your medical records. With [employer], you can request a clinic appointment, read your test results, renew a prescription or communicate with your care team.     To access your existing account, please contact your [facility] or call [phone] for assistance.        Care EveryWhere ID     This is your Care EveryWhere ID. This  could be used by other organizations to access your [place] medical records  [id number]        Your Vitals Were     Pulse Pulse Oximetry                72 98%           Blood Pressure from Last 3 Encounters:   10/25/18 148/79   10/02/18 122/67    Weight from Last 3 Encounters:   10/02/18 106.6 kg (235 lb)   09/10/18 106.1 kg (234 lb)   08/29/18 106.1 kg (234 lb)              Today, you had the following     No orders found for display       Primary Care Provider Office Phone # Fax #    [facility] [phone]       [address] [facility] [address]        Equal Access to Services     RONDEY ROTHMAN : Hadii damian yao hadasho Soric, waaxda luqadaha, qaybta kaalmada adeegyada, sami byrd. So [facility] [phone].    ATENCIÓN: Si habla español, tiene a mckeon disposición servicios gratuitos de asistencia lingüística. Llame al [phone].    We comply with applicable federal civil rights laws and [place] laws. We do not discriminate on the basis of race, color, national origin, age, disability, sex, sexual orientation, or gender identity.            Thank you!     Thank you for choosing [facility]  for your care. Our goal is always to provide you with excellent care. Hearing back from our patients is one way we can continue to improve our services. Please take a few minutes to complete the written survey that you may receive in the mail after your visit with us. Thank you!             Your Updated Medication List - Protect others around you: Learn how to safely use, store and throw away your medicines at www.disposemymeds.org.          This list is accurate as of 10/25/18 11:59 PM.  Always use your most recent med list.                   Brand Name Dispense Instructions for use Diagnosis    ALPRAZOLAM PO      Take 1 mg by mouth nightly as needed for anxiety        baclofen 10 MG tablet    LIORESAL     as needed        cholecalciferol 1000 UNIT  tablet    vitamin D3     1,000 Units        cyanocobalamin 1000 MCG tablet    vitamin  B-12     1,000 mcg        DULoxetine 60 MG EC capsule    CYMBALTA     Pt taking 120 mg cymbalta hs        fluticasone 50 MCG/ACT spray    FLONASE     SPRAY 2 SPRAYS INTO EACH NOSTRIL 1 TIME PER DAY @ 6 PM        gabapentin 600 MG tablet    NEURONTIN     Take 600 mg every morning and 1200 mg q HS, Disp # QS x 1 month        ipratropium 0.06 % spray    ATROVENT     2 sprays        medical cannabis (Patient's own supply.  Not a prescription)      PRN per parameter Use oil morning and evening. Spray 2-4 sprays as needed.        rOPINIRole 0.25 MG tablet    REQUIP     0.25 mg

## 2018-11-08 NOTE — PROGRESS NOTES
2 Week post op visit s/p left thyroid lobectomy and open biopsy right inferior thyroid nodule. Since the surgery patient denies any problem with voice quality, inspiration or swallowing. No sx of hypocalcemia.    PE: Wound is healing well. No evidence of hematoma, seroma or infection.    Pathology reviewed with patient-both nodules benign    Asses:  F/U s/p left thyroid lobectomy    Plan:  Reviewed with patient wound management and massage  Check TFT's at 6 weeks post op  Follow up with PCP

## 2018-11-27 DIAGNOSIS — E04.1 THYROID NODULE: Primary | ICD-10-CM

## 2018-11-28 ENCOUNTER — TRANSFERRED RECORDS (OUTPATIENT)
Dept: HEALTH INFORMATION MANAGEMENT | Facility: CLINIC | Age: 63
End: 2018-11-28

## 2018-12-04 DIAGNOSIS — E04.1 THYROID NODULE: Primary | ICD-10-CM

## 2019-01-16 ENCOUNTER — TRANSFERRED RECORDS (OUTPATIENT)
Dept: HEALTH INFORMATION MANAGEMENT | Facility: CLINIC | Age: 64
End: 2019-01-16

## 2019-01-16 ENCOUNTER — TELEPHONE (OUTPATIENT)
Dept: SURGERY | Facility: CLINIC | Age: 64
End: 2019-01-16

## 2019-01-16 NOTE — TELEPHONE ENCOUNTER
[facility] Call Center    Phone Message    May a detailed message be left on voicemail: no    Reason for Call: Other: Pt had blood drawn at [facility].  Please look for them.  Labs done today 1.16.19    Action Taken: Message routed to:  Adult Clinics: Surgery, General p [phone]

## 2019-01-21 NOTE — TELEPHONE ENCOUNTER
Pt lookin for results of 1/16/19 labs done in [place], as she has not heard yet and sent a message last week.  Please have Dr. Wasserman follow up with pt, as pt also would like to talk to her.  Thank you!

## 2019-01-29 ENCOUNTER — TELEPHONE (OUTPATIENT)
Dept: OTOLARYNGOLOGY | Facility: CLINIC | Age: 64
End: 2019-01-29

## 2019-05-22 ENCOUNTER — TRANSFERRED RECORDS (OUTPATIENT)
Dept: HEALTH INFORMATION MANAGEMENT | Facility: CLINIC | Age: 64
End: 2019-05-22

## 2019-06-21 ENCOUNTER — MEDICAL CORRESPONDENCE (OUTPATIENT)
Dept: HEALTH INFORMATION MANAGEMENT | Facility: CLINIC | Age: 64
End: 2019-06-21

## 2019-07-23 ENCOUNTER — OFFICE VISIT (OUTPATIENT)
Dept: NEUROLOGY | Facility: CLINIC | Age: 64
End: 2019-07-23
Payer: MEDICARE

## 2019-07-23 VITALS
OXYGEN SATURATION: 96 % | HEART RATE: 108 BPM | SYSTOLIC BLOOD PRESSURE: 137 MMHG | BODY MASS INDEX: 39.24 KG/M2 | WEIGHT: 250 LBS | HEIGHT: 67 IN | DIASTOLIC BLOOD PRESSURE: 84 MMHG

## 2019-07-23 DIAGNOSIS — R20.9 SENSORY DISORDER: Primary | ICD-10-CM

## 2019-07-23 PROCEDURE — 99204 OFFICE O/P NEW MOD 45 MIN: CPT | Performed by: PSYCHIATRY & NEUROLOGY

## 2019-07-23 RX ORDER — SENNOSIDES 8.6 MG
650-1300 CAPSULE ORAL EVERY 8 HOURS PRN
COMMUNITY
End: 2020-05-19

## 2019-07-23 RX ORDER — HYDROCODONE BITARTRATE AND ACETAMINOPHEN 5; 325 MG/1; MG/1
1 TABLET ORAL EVERY 6 HOURS PRN
COMMUNITY
Start: 2019-07-17 | End: 2020-05-19

## 2019-07-23 RX ORDER — TRAMADOL HYDROCHLORIDE 50 MG/1
TABLET ORAL 2 TIMES DAILY PRN
Refills: 1 | COMMUNITY
Start: 2019-03-28 | End: 2019-11-20

## 2019-07-23 ASSESSMENT — PAIN SCALES - GENERAL: PAINLEVEL: SEVERE PAIN (6)

## 2019-07-23 ASSESSMENT — MIFFLIN-ST. JEOR: SCORE: 1726.62

## 2019-07-23 NOTE — LETTER
"    7/23/2019         RE: Joana Santana  [address] [place] [address]        Dear Colleague,    Thank you for referring your patient, Joana Santana, to the [facility]. Please see a copy of my visit note below.    Visit Date:   07/23/2019      PRIMARY CARE PHYSICIAN:  Daniel Saavedra MD      HISTORY OF PRESENT ILLNESS:  This patient is a 64-year-old right-handed woman seen at the request of Dr. Saavedra for neurologic consultation with concerns about sensory disturbance in the hands and feet.  She is here with her , Walker.  She reports that the sensory disturbance began this year.  She describes it as a pain, tingling, numbness in her fingers, hands and in her feet.  Sometimes her feet are cold.  She does have a history of low back problems, and has had a pain stimulator in the low back for a year.  She has never had low back surgery.  She has another symptom which she describes as \"trembling.\"  She says the sensation of trembling is felt throughout her body and was wondering if it could possibly be her pain stimulator.  This has been checked out on a couple of occasions and the assessment has been that this symptom is not due to the pain stimulator.  The trembling sensation is worse when she is more active.  She did have a vascular evaluation that was nondiagnostic.  The sensation of trembling is different from the numbness and tingling.  The numbness and tingling tends to occur more sporadically.  She may shake out her hands when it occurs.  The inner trembling is described as more of a \"buzzing.\"  She is able to walk without much difficulty, but does not walk distance.  She does not exercise much because she is limited by her low back pain.  However, she can swim up to 30-40 minutes, but has not been doing so this summer.  She has occasional bowel or bladder urgency.  She has no issues with vision, hearing, speech or swallowing.  She has no problem climbing stairs.  " She denies headache.  She may get her symptoms of pain in the muscles as well.  She also can identify hypersensitivity at times related to the pain.  She has chronic neck issues.  She easily gets overheated.      PAST MEDICAL HISTORY:  Significant for high blood pressure and prediabetes.  She also has some issues with thyroid insufficiency.  She does not have heart disease or stroke.  She rarely drinks.  She does not smoke.  She has been through her change of life.       MEDICATIONS:  I reviewed her medications with her.  She is on Duloxetine 60 mg a day, not 120 as indicated by the record.  She takes gabapentin two 600 mg tablets at night for a total daily dose of 1200 mg.  She takes it for nerve pain and she is unclear in her mind whether or not it helps.  She is on magnesium and also is on medical cannabis, but not on a daily basis.  She takes ropinirole 0.25 mg for restless leg syndrome, 1-2 tablets in the evening each night.  She is on tramadol infrequently.  She also is on vitamin D and alprazolam.      SOCIAL HISTORY:  She is  with 4 children.  She is not working outside the home.      FAMILY HISTORY:  Positive for cancer and diabetes.      PHYSICAL EXAMINATION:   GENERAL:  The patient is cooperative and in no distress.   VITAL SIGNS:  Her blood pressure is 137/84.   NECK:  There are no carotid bruits.   HEART:  Auscultation of the heart shows S1 and S2.   NEUROLOGIC:  The patient is alert, oriented and lucid.   CRANIAL NERVES:  Shows full visual fields to confrontation.  Funduscopic exam shows sharp discs bilaterally.  Eye movements are complete and conjugate without nystagmus.  Pupils react to light.  Facial sensation is normal.  Face moves symmetrically.  Palate elevates in the midline.  Tongue protrudes in the midline.   MOTOR:  Evaluation shows no pronator drift, normal finger tapping, finger-nose-finger and heel-knee-shin.  She has good muscle bulk in the feet.  Strength is well preserved in the  arms, hands and legs.  Muscle stretch reflexes are absent in the arms and reduced at the knees and ankles.  Toes are downgoing.   SENSORY:  Exam shows a preserved vibration and temperature in the hands and feet.  She does describe symptoms of numbness and pain in the lateral thighs bilaterally.  She is somewhat overweight.  I did review with her from a medical illustration the nature of meralgia paresthetica due to compression of the lateral cutaneous nerve of the thigh.  That does explain some of the symptoms in her legs.  Romberg sign is absent.  She can walk on her heels, toes and tandem.      She did have an electrodiagnostic evaluation performed in December.  There was no evidence of neuropathy, radiculopathy, or myopathy.  However, a comment is made that there were fasciculations in both legs below the knees and muscles tested.  The significance of this finding is indeterminate.  She does not have any visible fasciculations to examination.  There was no evidence of neuropathy.      She also has had lab work performed.  A TSH was normal in January.  NANI was negative.  In February, a vitamin B12 level was 800.  Vitamin D was slightly low at 26 and she is on replacement.  A Lyme titer was negative.      She also had a hemoglobin A1c in 06/2018, it was 5.8 and in 04/2017 it was 6.3.      ASSESSMENT:   1.  Sensory disturbance in the hands and feet.   2.  Sensation of inner trembling.   3.  Restless legs syndrome.      DISCUSSION:  The patient is seen with the above problem list.  With regard to the symptoms of sensory disturbance in the hands and feet, this could represent neuropathy.  There is some fluctuation to the symptom.  I am going to repeat the electrodiagnostic evaluation, especially to followup on the fasciculations identified in the lower leg muscles bilaterally, and also to see if there is perhaps an element of neuropathy.  The patient could have small fiber type neuropathy, although the symptoms are  somewhat intermittent.  With regard to the sensation of inner tremoring, the etiology is not determined.  She does have a history of restless legs syndrome and is on ropinirole.  I did advise her that duloxetine can exacerbate the symptoms of restless legs syndrome.  She is on a dose of 60 mg a day, not 120 as indicated by the medical record.  She is on gabapentin and is uncertain that it is helping.  I suggested she taper off it over a few days to see if any of the symptoms worsen.  I am going to obtain an EMG of an arm and leg to assess for neuropathy and determine if there are indeed fasciculations noted.  MRI of the cervical spine will be obtained to make sure there are no structural or inflammatory lesions.  I will see her in followup to review these results when available.         JYOTHI ALEXANDRE MD             D: 2019   T: 2019   MT: SLADE      Name:     DANNY POMPA   MRN:      [id number]        Account:      [id number]   :      1955           Visit Date:   2019      Document: [id number]       Again, thank you for allowing me to participate in the care of your patient.        Sincerely,        Jyothi Alexandre MD

## 2019-07-23 NOTE — PROGRESS NOTES
"Visit Date:   07/23/2019      PRIMARY CARE PHYSICIAN:  Daniel Saavedra MD      HISTORY OF PRESENT ILLNESS:  This patient is a 64-year-old right-handed woman seen at the request of Dr. Saavedra for neurologic consultation with concerns about sensory disturbance in the hands and feet.  She is here with her , Walker.  She reports that the sensory disturbance began this year.  She describes it as a pain, tingling, numbness in her fingers, hands and in her feet.  Sometimes her feet are cold.  She does have a history of low back problems, and has had a pain stimulator in the low back for a year.  She has never had low back surgery.  She has another symptom which she describes as \"trembling.\"  She says the sensation of trembling is felt throughout her body and was wondering if it could possibly be her pain stimulator.  This has been checked out on a couple of occasions and the assessment has been that this symptom is not due to the pain stimulator.  The trembling sensation is worse when she is more active.  She did have a vascular evaluation that was nondiagnostic.  The sensation of trembling is different from the numbness and tingling.  The numbness and tingling tends to occur more sporadically.  She may shake out her hands when it occurs.  The inner trembling is described as more of a \"buzzing.\"  She is able to walk without much difficulty, but does not walk distance.  She does not exercise much because she is limited by her low back pain.  However, she can swim up to 30-40 minutes, but has not been doing so this summer.  She has occasional bowel or bladder urgency.  She has no issues with vision, hearing, speech or swallowing.  She has no problem climbing stairs.  She denies headache.  She may get her symptoms of pain in the muscles as well.  She also can identify hypersensitivity at times related to the pain.  She has chronic neck issues.  She easily gets overheated.      PAST MEDICAL HISTORY:  Significant for " high blood pressure and prediabetes.  She also has some issues with thyroid insufficiency.  She does not have heart disease or stroke.  She rarely drinks.  She does not smoke.  She has been through her change of life.       MEDICATIONS:  I reviewed her medications with her.  She is on Duloxetine 60 mg a day, not 120 as indicated by the record.  She takes gabapentin two 600 mg tablets at night for a total daily dose of 1200 mg.  She takes it for nerve pain and she is unclear in her mind whether or not it helps.  She is on magnesium and also is on medical cannabis, but not on a daily basis.  She takes ropinirole 0.25 mg for restless leg syndrome, 1-2 tablets in the evening each night.  She is on tramadol infrequently.  She also is on vitamin D and alprazolam.      SOCIAL HISTORY:  She is  with 4 children.  She is not working outside the home.      FAMILY HISTORY:  Positive for cancer and diabetes.      PHYSICAL EXAMINATION:   GENERAL:  The patient is cooperative and in no distress.   VITAL SIGNS:  Her blood pressure is 137/84.   NECK:  There are no carotid bruits.   HEART:  Auscultation of the heart shows S1 and S2.   NEUROLOGIC:  The patient is alert, oriented and lucid.   CRANIAL NERVES:  Shows full visual fields to confrontation.  Funduscopic exam shows sharp discs bilaterally.  Eye movements are complete and conjugate without nystagmus.  Pupils react to light.  Facial sensation is normal.  Face moves symmetrically.  Palate elevates in the midline.  Tongue protrudes in the midline.   MOTOR:  Evaluation shows no pronator drift, normal finger tapping, finger-nose-finger and heel-knee-shin.  She has good muscle bulk in the feet.  Strength is well preserved in the arms, hands and legs.  Muscle stretch reflexes are absent in the arms and reduced at the knees and ankles.  Toes are downgoing.   SENSORY:  Exam shows a preserved vibration and temperature in the hands and feet.  She does describe symptoms of numbness  and pain in the lateral thighs bilaterally.  She is somewhat overweight.  I did review with her from a medical illustration the nature of meralgia paresthetica due to compression of the lateral cutaneous nerve of the thigh.  That does explain some of the symptoms in her legs.  Romberg sign is absent.  She can walk on her heels, toes and tandem.      She did have an electrodiagnostic evaluation performed in December.  There was no evidence of neuropathy, radiculopathy, or myopathy.  However, a comment is made that there were fasciculations in both legs below the knees and muscles tested.  The significance of this finding is indeterminate.  She does not have any visible fasciculations to examination.  There was no evidence of neuropathy.      She also has had lab work performed.  A TSH was normal in January.  NANI was negative.  In February, a vitamin B12 level was 800.  Vitamin D was slightly low at 26 and she is on replacement.  A Lyme titer was negative.      She also had a hemoglobin A1c in 06/2018, it was 5.8 and in 04/2017 it was 6.3.      ASSESSMENT:   1.  Sensory disturbance in the hands and feet.   2.  Sensation of inner trembling.   3.  Restless legs syndrome.      DISCUSSION:  The patient is seen with the above problem list.  With regard to the symptoms of sensory disturbance in the hands and feet, this could represent neuropathy.  There is some fluctuation to the symptom.  I am going to repeat the electrodiagnostic evaluation, especially to followup on the fasciculations identified in the lower leg muscles bilaterally, and also to see if there is perhaps an element of neuropathy.  The patient could have small fiber type neuropathy, although the symptoms are somewhat intermittent.  With regard to the sensation of inner tremoring, the etiology is not determined.  She does have a history of restless legs syndrome and is on ropinirole.  I did advise her that duloxetine can exacerbate the symptoms of restless  legs syndrome.  She is on a dose of 60 mg a day, not 120 as indicated by the medical record.  She is on gabapentin and is uncertain that it is helping.  I suggested she taper off it over a few days to see if any of the symptoms worsen.  I am going to obtain an EMG of an arm and leg to assess for neuropathy and determine if there are indeed fasciculations noted.  MRI of the cervical spine will be obtained to make sure there are no structural or inflammatory lesions.  I will see her in followup to review these results when available.         JYOTHI HARRIS MD             D: 2019   T: 2019   MT: SLADE      Name:     DANNY POMPA   MRN:      [id number]        Account:      [id number]   :      1955           Visit Date:   2019      Document: [id number]

## 2019-07-23 NOTE — NURSING NOTE
"Joana Santana's goals for this visit include: consult  She requests these members of her care team be copied on today's visit information:     PCP: Daniel Saavedra    Referring Provider:  No referring provider defined for this encounter.    /84   Pulse 108   Ht 1.702 m (5' 7\")   Wt 113.4 kg (250 lb)   SpO2 96%   BMI 39.16 kg/m      Do you need any medication refills at today's visit? n  "

## 2019-07-24 ENCOUNTER — TELEPHONE (OUTPATIENT)
Dept: NEUROLOGY | Facility: CLINIC | Age: 64
End: 2019-07-24

## 2019-07-24 NOTE — TELEPHONE ENCOUNTER
Writer spoke with the patient, she stated she had a cervical MRI done in March 2019 with [facility]([phone]).    She would like to know if this would be acceptable for the cervical MRI that was ordered yesterday by Dr. Alexandre?    The patient would like a call back to discuss further. If Dr. Alexandre would like the patient to do the MRI cervical, she will contact the Provider who implanted the lumbar pain stimulator, for the serial number.    The patient can be reached at home. Thank you.    Laila CR [facility] ([place], [place])  Adult Med Spec/Surg Spec   [phone]

## 2019-07-31 NOTE — TELEPHONE ENCOUNTER
I called pt to report that MRI cervical and lumbar from March 2019 was reviewed.  There is some narrowing in cervical spine that could cause sx in hands and feet, though no suri cord compression and no evidence of signal change in cord.  Recommend that pt move up EMG study and f/u with me.  Will discuss at that time.

## 2019-09-16 ENCOUNTER — OFFICE VISIT (OUTPATIENT)
Dept: NEUROLOGY | Facility: CLINIC | Age: 64
End: 2019-09-16
Attending: PSYCHIATRY & NEUROLOGY
Payer: MEDICARE

## 2019-09-16 DIAGNOSIS — R20.9 SENSORY DISORDER: ICD-10-CM

## 2019-09-16 DIAGNOSIS — R25.3 FASCICULATIONS: Primary | ICD-10-CM

## 2019-09-16 PROCEDURE — 95885 MUSC TST DONE W/NERV TST LIM: CPT | Mod: 59 | Performed by: PSYCHIATRY & NEUROLOGY

## 2019-09-16 PROCEDURE — 95910 NRV CNDJ TEST 7-8 STUDIES: CPT | Performed by: PSYCHIATRY & NEUROLOGY

## 2019-09-16 PROCEDURE — 95886 MUSC TEST DONE W/N TEST COMP: CPT | Performed by: PSYCHIATRY & NEUROLOGY

## 2019-09-16 NOTE — PROGRESS NOTES
[facility] EMG Laboratory      Nerve Conduction & EMG Report          Patient:       Joana Santana  Patient ID:    [id number]  Gender:        Female  YOB: 1955  Age:           64 Years 7 Months      History and Examination:  Joana Santana is a 64 year old woman with low back pain and generalized sensory symptoms. A previous electrodiagnostic study demonstrated normal nerve conduction studies and fasciculation potentials and myokymia in both lower limbs. A followup study is requested of one upper and one lower limb.    Techniques:  Motor conduction studies were done with surface recording electrodes. Sensory conduction studies were performed with surface electrodes, unless indicated otherwise by (n), designating the use of subdermal recording electrodes. Temperature was monitored and recorded throughout the study. Upper extremities were maintained at a temperature of 32 degrees Centigrade or higher.  Lower extremities were maintained at a temperature of 31 degrees Centigrade or higher. EMG was done with a concentric needle electrode.     Results:  Right sural and ulnar sensory conduction studies were normal. Right median sensory conduction studies demonstrated mild slowing of conduction. Right median, ulnar, peroneal, and tibial motor conduction studies were normal. Electromyography demonstrated rare fasciculation potentials in bilateral medial gastrocnemius muscles. Rare insertional fasciculation potentials and a brief run of positive sharp waves were seen in the left tibialis anterior muscle but were not reproducible. Voluntary activation demonstrated normal motor unit configuration and firing patterns.     Interpretation:  This is a minimally abnormal study, demonstrating electrophysiologic evidence of the followin. Reproducible fasciculation potentials were seen in the medial gastrocnemius muscles only. This can occur in normal individuals and is a finding of  uncertain clinical significance.  2. 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       Alan Webster MD        Sensory NCS      Nerve / Sites Rec. Site Onset Peak Ref. NP Amp Ref. PP Amp Dist Seferino Ref. Temp     ms ms ms  V  V  V cm m/s m/s  C   R MEDIAN - Dig II      Dig II Wrist 3.18 3.85  13.0  12.4 14 44.1  32.4   R ULNAR - Dig V      Dig V Wrist 2.29 2.97  8.5 8.0 8.9 12.5 54.5 48.0 32.6   R SURAL - Lat Mall 60      Calf Ankle 2.97 3.75  6.8 5.0 5.8 14 47.2 38.0 33.5   R MEDIAN - Ulnar - Palmar      Median Wrist 1.98 2.45 2.40 52.2  75.4 8 40.4  32.9      Ulnar Wrist 1.46 1.93 2.40 23.4  25.1 8 54.9  32.7       Motor NCS      Nerve / Sites Rec. Site Lat Ref. Amp Ref. Rel Amp Dist Seferino Ref. Dur. Area Temp.     ms ms mV mV % cm m/s m/s ms %  C   R MEDIAN - APB      Wrist APB 3.96 4.40 8.6 5.0 100 8   3.80 100 32.7      Elbow APB 7.81  7.8  91.1 22 57.1 48.0 5.26 111    R ULNAR - ADM      Wrist ADM 2.76 3.50 8.9 5.0 100 8   5.31 100 32.6      B.Elbow ADM 6.25  8.0  90 20 57.3 48.0 5.63 98.9 32.6      A.Elbow ADM 7.92  7.8  87.2 9 54.0 48.0 5.89 91.1 32.6   R DEEP PERONEAL - EDB 60      Ankle EDB 4.48 6.00 2.0 2.0 100 8   6.09 100 32.9      FibHead EDB 12.08  1.6  78.9 35 46.0 38.0 7.14 85 32.9      Pop Fos EDB 13.91  1.6  79.2 9 49.4 38.0 6.93 95.8 32.9   R TIBIAL - AH      Ankle AH 2.76 6.00 11.9 4.0 100 8   5.36 100 32.7      Pop Fos AH 10.36  4.5  37.8 41 53.9 38.0 8.07 72.9 32.7       F  Wave      Nerve Min F Lat Max F Lat Mean FLat Temp.    ms ms ms  C   R TIBIAL 48.12 52.19 50.50 32.6   R ULNAR 28.13 31.56 29.32 32.6       EMG Summary Table     Spontaneous MUAP Recruitment    IA Fib PSW Fasc H.F. Amp Dur. PPP Pattern   R. TIB ANTERIOR N None None None None N N N N   R. GASTROCN (MED) N None None 1+ None N N N N   R. VAST LATERALIS N None None None None N N N N   L. GASTROCN (MED) N None None 1+ None N N N N   L. TIB ANTERIOR N None None None None N N N N   R. FIRST D INTEROSS N None None None None N N N N   R. EXT  DIG COMM N None None None None N N N N   R. PRON TERES N None None None None N N N N

## 2019-09-16 NOTE — LETTER
9/16/2019         RE: Joana Santana  [address] [place] [address]        Dear Colleague,    Thank you for referring your patient, Joana Santana, to the [facility]. Please see a copy of my visit note below.    [facility] EMG [facility]      Nerve Conduction & EMG Report          Patient:       Joana Santana  Patient ID:    [id number]  Gender:        Female  YOB: 1955  Age:           64 Years 7 Months      History and Examination:  Joana Santana is a 64 year old woman with low back pain and generalized sensory symptoms. A previous electrodiagnostic study demonstrated normal nerve conduction studies and fasciculation potentials and myokymia in both lower limbs. A followup study is requested of one upper and one lower limb.    Techniques:  Motor conduction studies were done with surface recording electrodes. Sensory conduction studies were performed with surface electrodes, unless indicated otherwise by (n), designating the use of subdermal recording electrodes. Temperature was monitored and recorded throughout the study. Upper extremities were maintained at a temperature of 32 degrees Centigrade or higher.  Lower extremities were maintained at a temperature of 31 degrees Centigrade or higher. EMG was done with a concentric needle electrode.     Results:  Right sural and ulnar sensory conduction studies were normal. Right median sensory conduction studies demonstrated mild slowing of conduction. Right median, ulnar, peroneal, and tibial motor conduction studies were normal. Electromyography demonstrated rare fasciculation potentials in bilateral medial gastrocnemius muscles. Rare insertional fasciculation potentials and a brief run of positive sharp waves were seen in the left tibialis anterior muscle but were not reproducible. Voluntary activation demonstrated normal motor unit configuration and firing patterns.      Interpretation:  This is a minimally abnormal study, demonstrating electrophysiologic evidence of the followin. Reproducible fasciculation potentials were seen in the medial gastrocnemius muscles only. This can occur in normal individuals and is a finding of uncertain clinical significance.  2. 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       Alan Webster MD        Sensory NCS      Nerve / Sites Rec. Site Onset Peak Ref. NP Amp Ref. PP Amp Dist Seferino Ref. Temp     ms ms ms  V  V  V cm m/s m/s  C   R MEDIAN - Dig II      Dig II Wrist 3.18 3.85  13.0  12.4 14 44.1  32.4   R ULNAR - Dig V      Dig V Wrist 2.29 2.97  8.5 8.0 8.9 12.5 54.5 48.0 32.6   R SURAL - Lat Mall 60      Calf Ankle 2.97 3.75  6.8 5.0 5.8 14 47.2 38.0 33.5   R MEDIAN - Ulnar - Palmar      Median Wrist 1.98 2.45 2.40 52.2  75.4 8 40.4  32.9      Ulnar Wrist 1.46 1.93 2.40 23.4  25.1 8 54.9  32.7       Motor NCS      Nerve / Sites Rec. Site Lat Ref. Amp Ref. Rel Amp Dist Seferino Ref. Dur. Area Temp.     ms ms mV mV % cm m/s m/s ms %  C   R MEDIAN - APB      Wrist APB 3.96 4.40 8.6 5.0 100 8   3.80 100 32.7      Elbow APB 7.81  7.8  91.1 22 57.1 48.0 5.26 111    R ULNAR - ADM      Wrist ADM 2.76 3.50 8.9 5.0 100 8   5.31 100 32.6      B.Elbow ADM 6.25  8.0  90 20 57.3 48.0 5.63 98.9 32.6      A.Elbow ADM 7.92  7.8  87.2 9 54.0 48.0 5.89 91.1 32.6   R DEEP PERONEAL - EDB 60      Ankle EDB 4.48 6.00 2.0 2.0 100 8   6.09 100 32.9      FibHead EDB 12.08  1.6  78.9 35 46.0 38.0 7.14 85 32.9      Pop Fos EDB 13.91  1.6  79.2 9 49.4 38.0 6.93 95.8 32.9   R TIBIAL - AH      Ankle AH 2.76 6.00 11.9 4.0 100 8   5.36 100 32.7      Pop Fos AH 10.36  4.5  37.8 41 53.9 38.0 8.07 72.9 32.7       F  Wave      Nerve Min F Lat Max F Lat Mean FLat Temp.    ms ms ms  C   R TIBIAL 48.12 52.19 50.50 32.6   R ULNAR 28.13 31.56 29.32 32.6       EMG Summary Table     Spontaneous MUAP Recruitment    IA Fib PSW Fasc H.F. Amp Dur. PPP Pattern   R. TIB ANTERIOR N None None None  None N N N N   R. GASTROCN (MED) N None None 1+ None N N N N   R. VAST LATERALIS N None None None None N N N N   L. GASTROCN (MED) N None None 1+ None N N N N   L. TIB ANTERIOR N None None None None N N N N   R. FIRST D INTEROSS N None None None None N N N N   R. EXT DIG COMM N None None None None N N N N   R. PRON TERES N None None None None N N N N                                Again, thank you for allowing me to participate in the care of your patient.        Sincerely,        Alan Webster MD

## 2019-09-17 ENCOUNTER — OFFICE VISIT (OUTPATIENT)
Dept: NEUROLOGY | Facility: CLINIC | Age: 64
End: 2019-09-17
Payer: MEDICARE

## 2019-09-17 VITALS
HEART RATE: 94 BPM | OXYGEN SATURATION: 95 % | SYSTOLIC BLOOD PRESSURE: 129 MMHG | BODY MASS INDEX: 39.09 KG/M2 | DIASTOLIC BLOOD PRESSURE: 86 MMHG | WEIGHT: 249.6 LBS

## 2019-09-17 DIAGNOSIS — G62.9 NEUROPATHY: Primary | ICD-10-CM

## 2019-09-17 PROCEDURE — 99214 OFFICE O/P EST MOD 30 MIN: CPT | Performed by: PSYCHIATRY & NEUROLOGY

## 2019-09-17 PROCEDURE — 00000402 ZZHCL STATISTIC TOTAL PROTEIN: Performed by: PSYCHIATRY & NEUROLOGY

## 2019-09-17 PROCEDURE — 82784 ASSAY IGA/IGD/IGG/IGM EACH: CPT | Performed by: PSYCHIATRY & NEUROLOGY

## 2019-09-17 PROCEDURE — 86334 IMMUNOFIX E-PHORESIS SERUM: CPT | Performed by: PSYCHIATRY & NEUROLOGY

## 2019-09-17 PROCEDURE — 84165 PROTEIN E-PHORESIS SERUM: CPT | Performed by: PSYCHIATRY & NEUROLOGY

## 2019-09-17 PROCEDURE — 36415 COLL VENOUS BLD VENIPUNCTURE: CPT | Performed by: PSYCHIATRY & NEUROLOGY

## 2019-09-17 RX ORDER — HYDROXYZINE PAMOATE 25 MG/1
50 CAPSULE ORAL
COMMUNITY
Start: 2019-09-06 | End: 2019-11-20

## 2019-09-17 RX ORDER — CYCLOBENZAPRINE HCL 10 MG
10 TABLET ORAL 2 TIMES DAILY PRN
Qty: 60 TABLET | Refills: 1 | Status: SHIPPED | OUTPATIENT
Start: 2019-09-17 | End: 2019-11-20

## 2019-09-17 RX ORDER — OXYCODONE AND ACETAMINOPHEN 5; 325 MG/1; MG/1
1 TABLET ORAL
COMMUNITY
Start: 2019-09-06

## 2019-09-17 RX ORDER — ROSUVASTATIN CALCIUM 10 MG/1
10 TABLET, COATED ORAL
COMMUNITY
Start: 2019-08-05

## 2019-09-17 ASSESSMENT — PAIN SCALES - GENERAL: PAINLEVEL: EXTREME PAIN (8)

## 2019-09-17 NOTE — LETTER
9/17/2019         RE: Joana aSntana  [address] [place] [address]        Dear Colleague,    Thank you for referring your patient, Joana Santana, to the [facility]. Please see a copy of my visit note below.    Visit Date:   09/17/2019      NEUROLOGY CONSULTATION       HISTORY OF PRESENT ILLNESS:  This patient is seen in followup with ongoing issues of sensory disturbance.  I initially saw her about 2 months ago.  She is here today with her , Walker.  Her symptom is a tingling in the bottoms of her feet, but also in her right hand.  The right hand is not affected as often as the feet.  She did have an MRI of the cervical spine performed in March.  I reviewed those images with her.  There is multilevel degenerative change and disk bulging with some narrowing of the central canal, but no suri compromise of neural structures noted on the study.  She also had an MRI of the lumbar spine that was unremarkable.  Apparently the MRI of the lumbar spine is going to be repeated in the near future.  She is on a complex regimen of medication.  This includes duloxetine for pain and depression.  She has been on it a couple of years and does not think it helps.  She is on gabapentin 1 tablet per day.  She is on oxycodone.  She reports that her calves bother her and they cramp.  She was given a prescription for carisoprodol, but has not yet started it.  She does try and take magnesium on a daily basis for muscle cramps.  She does describe dry mouth and dry eyes but thinks it might be related to medication.  She does have some symptoms of carpal tunnel syndrome that bother her at night in the right hand.  She also has been newly diagnosed with diabetes.  She always feels tired and in pain.  She has total body aching.      PHYSICAL EXAMINATION:   GENERAL:  On exam today, the patient is cooperative and in no distress.   VITAL SIGNS:  Her blood pressure is 129/86.        She did have an  electrodiagnostic study performed by Dr. Webtser.  It showed a mild right median neuropathy at the wrist on the right.  In addition, fasciculations were noted in the calf muscles.  These have been identified on previous electrodiagnostic study.  There was no evidence of other denervation potentials.  It was felt that the fasciculation potentials were benign and of uncertain clinical significance.        In reviewing her medical record, she had a recent TSH that was elevated at 7.6.  In 10/2016, she had Sjogren antibodies checked which were negative.  Sedimentation more recently was 10 in 12/2018, Antinuclear antibody testing in February was negative.      ASSESSMENT:   1.  Sensory disturbance in the feet and hands.   2.  Benign fasciculations in the calves.      DISCUSSION:  The patient is seen with the above problem list.  With regard to the sensory disturbance, this is likely small fiber neuropathy.  She does have a new diagnosis of diabetes and that is the likely etiology of the neuropathy.  I am going to check an immunofixation and a protein electrophoresis in that regard.      With regard to the cramping in the calves and the fasciculations, it is not clear to me that these are related.  In any case, I am going to try her on cyclobenzaprine 10 mg twice a day as needed to see if that might help some of the symptoms of muscle cramping in the calves.      She had questions about tapering duloxetine.  She has been on it for 2 years and does not think it does anything.  I recommended she discuss this with her therapist, but I would go ahead and taper the duloxetine if it does not appear to be accomplishing its purpose.        I will see her in followup in 2 months or so for reexamination.      This was a 25-minute appointment, more than half of which was spent in counseling and coordination of care.         JYOTHI HARRIS MD             D: 09/17/2019   T: 09/17/2019   MT: LORELEI      Name:     DANNY POMPA   MRN:       [id number]        Account:      [id number]   :      1955           Visit Date:   2019      Document: [id number]       cc: Daniel Saavedra MD      Again, thank you for allowing me to participate in the care of your patient.        Sincerely,        Noble Alexandre MD

## 2019-09-17 NOTE — NURSING NOTE
Joana Santana's goals for this visit include:   Chief Complaint   Patient presents with     RECHECK     6 wk follow up(after EMG/MRI)       She requests these members of her care team be copied on today's visit information:     PCP: Daniel Saavedra    Referring Provider:  Noble Alexandre MD  [facility]  [address] [place] [address]    /86 (BP Location: Left arm, Patient Position: Sitting, Cuff Size: Adult Large)   Pulse 94   Wt 113.2 kg (249 lb 9.6 oz)   SpO2 95%   BMI 39.09 kg/m      Do you need any medication refills at today's visit? No

## 2019-09-17 NOTE — PROGRESS NOTES
Visit Date:   09/17/2019      NEUROLOGY CONSULTATION       HISTORY OF PRESENT ILLNESS:  This patient is seen in followup with ongoing issues of sensory disturbance.  I initially saw her about 2 months ago.  She is here today with her , Walker.  Her symptom is a tingling in the bottoms of her feet, but also in her right hand.  The right hand is not affected as often as the feet.  She did have an MRI of the cervical spine performed in March.  I reviewed those images with her.  There is multilevel degenerative change and disk bulging with some narrowing of the central canal, but no suri compromise of neural structures noted on the study.  She also had an MRI of the lumbar spine that was unremarkable.  Apparently the MRI of the lumbar spine is going to be repeated in the near future.  She is on a complex regimen of medication.  This includes duloxetine for pain and depression.  She has been on it a couple of years and does not think it helps.  She is on gabapentin 1 tablet per day.  She is on oxycodone.  She reports that her calves bother her and they cramp.  She was given a prescription for carisoprodol, but has not yet started it.  She does try and take magnesium on a daily basis for muscle cramps.  She does describe dry mouth and dry eyes but thinks it might be related to medication.  She does have some symptoms of carpal tunnel syndrome that bother her at night in the right hand.  She also has been newly diagnosed with diabetes.  She always feels tired and in pain.  She has total body aching.      PHYSICAL EXAMINATION:   GENERAL:  On exam today, the patient is cooperative and in no distress.   VITAL SIGNS:  Her blood pressure is 129/86.        She did have an electrodiagnostic study performed by Dr. Webster.  It showed a mild right median neuropathy at the wrist on the right.  In addition, fasciculations were noted in the calf muscles.  These have been identified on previous electrodiagnostic study.  There was  no evidence of other denervation potentials.  It was felt that the fasciculation potentials were benign and of uncertain clinical significance.        In reviewing her medical record, she had a recent TSH that was elevated at 7.6.  In 10/2016, she had Sjogren antibodies checked which were negative.  Sedimentation more recently was 10 in 2018, Antinuclear antibody testing in February was negative.      ASSESSMENT:   1.  Sensory disturbance in the feet and hands.   2.  Benign fasciculations in the calves.      DISCUSSION:  The patient is seen with the above problem list.  With regard to the sensory disturbance, this is likely small fiber neuropathy.  She does have a new diagnosis of diabetes and that is the likely etiology of the neuropathy.  I am going to check an immunofixation and a protein electrophoresis in that regard.      With regard to the cramping in the calves and the fasciculations, it is not clear to me that these are related.  In any case, I am going to try her on cyclobenzaprine 10 mg twice a day as needed to see if that might help some of the symptoms of muscle cramping in the calves.      She had questions about tapering duloxetine.  She has been on it for 2 years and does not think it does anything.  I recommended she discuss this with her therapist, but I would go ahead and taper the duloxetine if it does not appear to be accomplishing its purpose.        I will see her in followup in 2 months or so for reexamination.      This was a 25-minute appointment, more than half of which was spent in counseling and coordination of care.         JYOTHI HARRIS MD             D: 2019   T: 2019   MT: WT      Name:     DANNY POMPA   MRN:      [id number]        Account:      [id number]   :      1955           Visit Date:   2019      Document: [id number]       cc: Daniel Saavedra MD          addendum: reviewed normal IEP/ELP with pt.

## 2019-09-18 LAB
ALBUMIN SERPL ELPH-MCNC: 4.4 G/DL (ref 3.7–5.1)
ALPHA1 GLOB SERPL ELPH-MCNC: 0.3 G/DL (ref 0.2–0.4)
ALPHA2 GLOB SERPL ELPH-MCNC: 0.7 G/DL (ref 0.5–0.9)
B-GLOBULIN SERPL ELPH-MCNC: 0.7 G/DL (ref 0.6–1)
GAMMA GLOB SERPL ELPH-MCNC: 0.7 G/DL (ref 0.7–1.6)
IGA SERPL-MCNC: 102 MG/DL (ref 70–380)
IGG SERPL-MCNC: 731 MG/DL (ref 695–1620)
IGM SERPL-MCNC: 124 MG/DL (ref 60–265)
M PROTEIN SERPL ELPH-MCNC: 0 G/DL
PROT PATTERN SERPL ELPH-IMP: NORMAL
PROT PATTERN SERPL IFE-IMP: NORMAL

## 2019-11-20 ENCOUNTER — OFFICE VISIT (OUTPATIENT)
Dept: NEUROLOGY | Facility: CLINIC | Age: 64
End: 2019-11-20
Payer: MEDICARE

## 2019-11-20 VITALS — DIASTOLIC BLOOD PRESSURE: 79 MMHG | SYSTOLIC BLOOD PRESSURE: 117 MMHG | HEART RATE: 74 BPM | OXYGEN SATURATION: 95 %

## 2019-11-20 DIAGNOSIS — G62.9 NEUROPATHY: Primary | ICD-10-CM

## 2019-11-20 PROCEDURE — 99214 OFFICE O/P EST MOD 30 MIN: CPT | Performed by: PSYCHIATRY & NEUROLOGY

## 2019-11-20 RX ORDER — THYROID 15 MG/1
15 TABLET ORAL
COMMUNITY
Start: 2019-10-04

## 2019-11-20 RX ORDER — DOCUSATE SODIUM 100 MG/1
CAPSULE, LIQUID FILLED ORAL
Refills: 2 | COMMUNITY
Start: 2019-09-25 | End: 2020-05-19

## 2019-11-20 RX ORDER — ATORVASTATIN CALCIUM 20 MG/1
20 TABLET, FILM COATED ORAL
COMMUNITY
Start: 2019-05-09 | End: 2020-05-19

## 2019-11-20 ASSESSMENT — PAIN SCALES - GENERAL: PAINLEVEL: NO PAIN (0)

## 2019-11-20 NOTE — PROGRESS NOTES
Visit Date:   11/20/2019      PRIMARY CARE PHYSICIAN:  Daniel Saavedra MD and Manny      HISTORY OF PRESENT ILLNESS:  This patient is seen in followup with sensory disturbance in the feet.  She is here with her , Walker.  I last saw the patient in September.  Her presentation was most suggestive of neuropathy.  She also was having some muscle spasms in her calves.  I started her on cyclobenzaprine.  Etiology of the neuropathy was uncertain, but suspicious for diabetic, in that she has a new diagnosis of glucose intolerance.  She reports that the symptoms are persisting.  They fluctuate.  She recently had low back surgery with a double level fusion done at [facility].  That was 5 weeks ago and it seems to be beneficial so far.  Her  says she is much more comfortable.  What she experiences as far as the neuropathy goes, is a tingling, prickling and numbness sensation in the feet and ankles.  It is worse in the evening and at night when she is trying to rest.  She will put heat on her feet and on her calves to try and reduce the symptom.  This helps somewhat.  Some nights are worse than others.  Her sleep has not been great, but this is in part because of the recent low back surgery.  She is doing some walking, up to 30 minutes in the store.  She is on oxycodone for pain management, but is taking less than she was before.  She is not on a sleeping medication now, but has been on trazodone and probably amitriptyline in the past.  She does have sleep apnea and has been on CPAP, but she has concerns about her breathing.  I advised her that she should discuss this with primary care, and she may perhaps be a candidate for pulmonary consultation.      She also is on thyroid replacement, and that is a recent addition.  The cyclobenzaprine I gave her has not been beneficial.  I told her to discontinue it.  She does take ropinirole for restless legs, but since being on the oxycodone, does not think she needs  it.  I told her to go ahead and hold it until she is off the oxycodone.  She does not take the hydroxyzine that is listed on her medication list.  She is on gabapentin 600 mg 1-2 tablets per day.  She is also on duloxetine 60 mg per day and occasional Alprazolam.      PHYSICAL EXAMINATION:   GENERAL:  The patient is cooperative and in no distress.   VITAL SIGNS:  Her blood pressure is 117/79.   NEUROLOGIC:  She has some reduction in EDB muscle bulk in the feet.  She has good strength in the feet and toes.  Muscle stretch reflexes are normal at the knees and ankles.  Toes are downgoing.  Sensory exam shows a patchy loss to pinprick sensation in the feet and lower legs, but temperature and vibratory sense appear preserved in the feet.  She can get up on her heels and toes without difficulty.      I began seeing the patient in July.  She has had a rather extensive evaluation for neuropathy.  A vitamin B12 level has been normal.  Sjogren's antibodies, NANI and sedimentation rate are unremarkable, as well as protein electrophoresis and immune fixation.  An electrodiagnostic evaluation performed a year ago in December was unremarkable by report.      ASSESSMENT:   1.  Sensory disturbance in the feet, probable small fiber neuropathy.      DISCUSSION:  The patient is seen in followup with sensory disturbance in the feet.  This is likely small fiber neuropathy.  I had a long talk with the patient and her  today about this diagnosis.  She is quite frustrated with the situation.  She is wondering about what further workup could be undertaken.  I am going to refer her to Dr. Webster  in this regard.  She may be a candidate either for repeat electrodiagnostic testing or perhaps a skin biopsy to assess for small fiber neuropathy.  I am not going to make any changes in her medicines, since she is on narcotics from her back surgery.  That will be a separate issue that seems to be mending.  I will see her in followup on an  as-needed basis.      This was a 25-minute appointment, more than half of which was spent in counseling and coordination of care.         JYOTHI HARRIS MD             D: 2019   T: 2019   MT: SLADE      Name:     DANNY POMPA   MRN:      [id number]        Account:      [id number]   :      1955           Visit Date:   2019      Document: [id number]

## 2019-11-20 NOTE — LETTER
11/20/2019         RE: Joana Santana  [address] [place] [address]        Dear Colleague,    Thank you for referring your patient, Joana Santana, to the [facility]. Please see a copy of my visit note below.    Visit Date:   11/20/2019      PRIMARY CARE PHYSICIAN:  Daniel Saavedra MD and Manny      HISTORY OF PRESENT ILLNESS:  This patient is seen in followup with sensory disturbance in the feet.  She is here with her , Walker.  I last saw the patient in September.  Her presentation was most suggestive of neuropathy.  She also was having some muscle spasms in her calves.  I started her on cyclobenzaprine.  Etiology of the neuropathy was uncertain, but suspicious for diabetic, in that she has a new diagnosis of glucose intolerance.  She reports that the symptoms are persisting.  They fluctuate.  She recently had low back surgery with a double level fusion done at [facility].  That was 5 weeks ago and it seems to be beneficial so far.  Her  says she is much more comfortable.  What she experiences as far as the neuropathy goes, is a tingling, prickling and numbness sensation in the feet and ankles.  It is worse in the evening and at night when she is trying to rest.  She will put heat on her feet and on her calves to try and reduce the symptom.  This helps somewhat.  Some nights are worse than others.  Her sleep has not been great, but this is in part because of the recent low back surgery.  She is doing some walking, up to 30 minutes in the store.  She is on oxycodone for pain management, but is taking less than she was before.  She is not on a sleeping medication now, but has been on trazodone and probably amitriptyline in the past.  She does have sleep apnea and has been on CPAP, but she has concerns about her breathing.  I advised her that she should discuss this with primary care, and she may perhaps be a candidate for pulmonary consultation.      She  also is on thyroid replacement, and that is a recent addition.  The cyclobenzaprine I gave her has not been beneficial.  I told her to discontinue it.  She does take ropinirole for restless legs, but since being on the oxycodone, does not think she needs it.  I told her to go ahead and hold it until she is off the oxycodone.  She does not take the hydroxyzine that is listed on her medication list.  She is on gabapentin 600 mg 1-2 tablets per day.  She is also on duloxetine 60 mg per day and occasional Alprazolam.      PHYSICAL EXAMINATION:   GENERAL:  The patient is cooperative and in no distress.   VITAL SIGNS:  Her blood pressure is 117/79.   NEUROLOGIC:  She has some reduction in EDB muscle bulk in the feet.  She has good strength in the feet and toes.  Muscle stretch reflexes are normal at the knees and ankles.  Toes are downgoing.  Sensory exam shows a patchy loss to pinprick sensation in the feet and lower legs, but temperature and vibratory sense appear preserved in the feet.  She can get up on her heels and toes without difficulty.      I began seeing the patient in July.  She has had a rather extensive evaluation for neuropathy.  A vitamin B12 level has been normal.  Sjogren's antibodies, NANI and sedimentation rate are unremarkable, as well as protein electrophoresis and immune fixation.  An electrodiagnostic evaluation performed a year ago in December was unremarkable by report.      ASSESSMENT:   1.  Sensory disturbance in the feet, probable small fiber neuropathy.      DISCUSSION:  The patient is seen in followup with sensory disturbance in the feet.  This is likely small fiber neuropathy.  I had a long talk with the patient and her  today about this diagnosis.  She is quite frustrated with the situation.  She is wondering about what further workup could be undertaken.  I am going to refer her to Dr. Webster  in this regard.  She may be a candidate either for repeat electrodiagnostic testing or  perhaps a skin biopsy to assess for small fiber neuropathy.  I am not going to make any changes in her medicines, since she is on narcotics from her back surgery.  That will be a separate issue that seems to be mending.  I will see her in followup on an as-needed basis.      This was a 25-minute appointment, more than half of which was spent in counseling and coordination of care.         JYOTHI ALEXANDRE MD             D: 2019   T: 2019   MT: SLADE      Name:     DANNY POMPA   MRN:      [id number]        Account:      [id number]   :      1955           Visit Date:   2019      Document: [id number]       Again, thank you for allowing me to participate in the care of your patient.        Sincerely,        Jyothi Alexandre MD

## 2019-11-20 NOTE — NURSING NOTE
Joana Santana's goals for this visit include:   Chief Complaint   Patient presents with     RECHECK     2 month follow up       She requests these members of her care team be copied on today's visit information:         PCP: Daniel Saavedra    Referring Provider:  No referring provider defined for this encounter.    /79 (BP Location: Left arm, Patient Position: Sitting, Cuff Size: Adult Large)   Pulse 74   SpO2 95%     Do you need any medication refills at today's visit?

## 2019-12-17 ENCOUNTER — TELEPHONE (OUTPATIENT)
Dept: NEUROLOGY | Facility: CLINIC | Age: 64
End: 2019-12-17

## 2019-12-17 NOTE — TELEPHONE ENCOUNTER
[facility] Call Center    Phone Message    May a detailed message be left on voicemail: yes    Reason for Call: Other: Patient would like a call back to discuss what will happen on the appt 12/20/19     Action Taken: Message routed to:  Adult Clinics: Neurology p [id number]

## 2019-12-17 NOTE — TELEPHONE ENCOUNTER
I returned patients call and she was wondering if any testing was to be completed at appointment.  I explained that her appt was for a consult and Dr. Key would determine if any testing would be needed. Pt  Verbalized understanding.    Juana Connelly LPN

## 2020-01-31 ENCOUNTER — OFFICE VISIT (OUTPATIENT)
Dept: NEUROLOGY | Facility: CLINIC | Age: 65
End: 2020-01-31
Attending: PSYCHIATRY & NEUROLOGY
Payer: MEDICARE

## 2020-01-31 VITALS
OXYGEN SATURATION: 96 % | BODY MASS INDEX: 38.48 KG/M2 | SYSTOLIC BLOOD PRESSURE: 133 MMHG | DIASTOLIC BLOOD PRESSURE: 83 MMHG | WEIGHT: 245.7 LBS | HEART RATE: 108 BPM

## 2020-01-31 DIAGNOSIS — G62.9 NEUROPATHY: Primary | ICD-10-CM

## 2020-01-31 PROCEDURE — 99213 OFFICE O/P EST LOW 20 MIN: CPT | Performed by: PSYCHIATRY & NEUROLOGY

## 2020-01-31 RX ORDER — LIDOCAINE 50 MG/G
1 PATCH TOPICAL EVERY 24 HOURS
Qty: 30 PATCH | Refills: 3 | Status: SHIPPED | OUTPATIENT
Start: 2020-01-31

## 2020-01-31 ASSESSMENT — PAIN SCALES - GENERAL: PAINLEVEL: EXTREME PAIN (8)

## 2020-01-31 NOTE — LETTER
2020         RE: Joana Santana  [address] [place] [address]        Dear Colleague,    Thank you for referring your patient, Joana Santana, to the [facility]. Please see a copy of my visit note below.    2020            Noble Alexandre MD   [address]   [place] [address]      Patient:  Joana Santana   MRN:  [id number]   :  1955      Dear Doctors:      I saw Joana Santana today at Dr. Alexandre's request for evaluation of possible small fiber neuropathy.  Ms. Santana is a 64-year-old woman with a 1-year history of paresthesias in the toes and aching in the calves.  Symptoms are most notable at rest and are uncomfortable.  They have not responded adequately to Gabapentin 300 mg t.i.d. and duloxetine 60 mg daily.  She presents for further diagnostic efforts in the context of possible small fiber neuropathy.      The patient's past medical history is notable for peripheral arterial disease, prediabetes and a recent possible diagnosis of suri diabetes, treated with metformin, obesity and fibromyalgia.      Family history, allergies and medication list are documented in the electronic medical record.  She denies chest discomfort or dyspnea.      PHYSICAL EXAMINATION:  The patient is a moderately obese, otherwise healthy-appearing woman.  The skin is intact and there are no stigmata of macrovascular disease.  Pulse is rapid at 108 as it has been rapid on numerous prior visits.  Pulse is regular and other vital signs are normal including oxygen saturation.  There is no peripheral edema or tenderness in the calves.  There is some tenderness that she reports as chronic in the right upper anterior chest but no other chest pain or dyspnea.    NEUROLOGIC EXAMINATION:  The patient is alert and cooperative.  Speech, language and affect are normal.  Cranial nerves II-XII are normal.  Extremity tone, bulk, strength and  rapid repetitive movements are normal.  Perception of pin is reduced distal to the ankles.  Perception of touch and vibration are preserved.  Reflexes are symmetric and preserved and there are no pathologic reflexes.  Coordination and gait are normal aside from a slightly antalgic aspect of her gait.      I explained the following to Ms. Pompa and her :  Her clinical presentation is consistent with Dr. Alexandre's suspicion for small fiber neuropathy.  Electrodiagnostic studies are reportedly negative and laboratory screening for inflammatory and nutritional etiologies of neuropathy is reportedly negative as well.  She is currently on 2 medications for neuropathic pain and higher doses of gabapentin may add benefit but could be associated with weight gain and cognitive changes.  I therefore suggested she address this further with [facility].  In addition, a trial of Lidoderm patches over the foot dorsa is reasonable and I have sent in a prescription for this.      Finally, I explained that I am typically ambivalent about a skin biopsy for epidermal nerve fiber density in this clinical presentation because it can be abnormal in asymptomatic diabetics as well.  That said, however, Ms. Pompa is very interested in further documentation of her suspected peripheral nerve disorder and the procedure is quite safe.  With that in mind, we will arrange this.  Followup will be with Dr. Alexandre and Zach Oakes PA-C.  I would be happy to see her on an as-needed basis; however, as well.            Sincerely,      AYANNA CARNES MD             D: 2020   T: 2020   MT: KB      Name:     DANNY POMPA   MRN:      [id number]        Account:      [id number]   :      1955      Document: [id number]       cc: Daniel Oakes PA-C       Again, thank you for allowing me to participate in the  care of your patient.        Sincerely,        Alan Webster MD

## 2020-01-31 NOTE — PATIENT INSTRUCTIONS
1. I will arrange a skin biopsy to better determine whether small fiber neuropathy is present. I suspect it is.  2. Try lidoderm patches on your feet.  3. I will send a copy of today's note to Dr Matt Oakes at [facility] in [place] so he knows what we did.

## 2020-01-31 NOTE — NURSING NOTE
Joana Santana's goals for this visit include: NEW  Chief Complaint   Patient presents with     Consult     Referred by Dr. Alexandre for Neuropathy       She requests these members of her care team be copied on today's visit information:     PCP: Daniel Saavedra    Referring Provider:  Noble Alexandre MD  [address]  [place] [address]    /83 (BP Location: Right arm, Patient Position: Sitting, Cuff Size: Adult Large)   Pulse 108   Wt 111.4 kg (245 lb 11.2 oz)   SpO2 96%   BMI 38.48 kg/m      Do you need any medication refills at today's visit? NO    Valencia MANDEL, MALENA

## 2020-02-01 ENCOUNTER — MYC MEDICAL ADVICE (OUTPATIENT)
Dept: NEUROLOGY | Facility: CLINIC | Age: 65
End: 2020-02-01

## 2020-02-03 NOTE — PROGRESS NOTES
2020            Noble Alexandre MD   [address]   [place] [address]      Patient:  Joana Santana   MRN:  [id number]   :  1955      Dear Doctors:      I saw Joana Santana today at Dr. Alexandre's request for evaluation of possible small fiber neuropathy.  Ms. Santana is a 64-year-old woman with a 1-year history of paresthesias in the toes and aching in the calves.  Symptoms are most notable at rest and are uncomfortable.  They have not responded adequately to Gabapentin 300 mg t.i.d. and duloxetine 60 mg daily.  She presents for further diagnostic efforts in the context of possible small fiber neuropathy.      The patient's past medical history is notable for peripheral arterial disease, prediabetes and a recent possible diagnosis of suri diabetes, treated with metformin, obesity and fibromyalgia.      Family history, allergies and medication list are documented in the electronic medical record.  She denies chest discomfort or dyspnea.      PHYSICAL EXAMINATION:  The patient is a moderately obese, otherwise healthy-appearing woman.  The skin is intact and there are no stigmata of macrovascular disease.  Pulse is rapid at 108 as it has been rapid on numerous prior visits.  Pulse is regular and other vital signs are normal including oxygen saturation.  There is no peripheral edema or tenderness in the calves.  There is some tenderness that she reports as chronic in the right upper anterior chest but no other chest pain or dyspnea.    NEUROLOGIC EXAMINATION:  The patient is alert and cooperative.  Speech, language and affect are normal.  Cranial nerves II-XII are normal.  Extremity tone, bulk, strength and rapid repetitive movements are normal.  Perception of pin is reduced distal to the ankles.  Perception of touch and vibration are preserved.  Reflexes are symmetric and preserved and there are no pathologic reflexes.  Coordination and gait are normal  aside from a slightly antalgic aspect of her gait.      I explained the following to Ms. Pompa and her :  Her clinical presentation is consistent with Dr. Alexandre's suspicion for small fiber neuropathy.  Electrodiagnostic studies are reportedly negative and laboratory screening for inflammatory and nutritional etiologies of neuropathy is reportedly negative as well.  She is currently on 2 medications for neuropathic pain and higher doses of gabapentin may add benefit but could be associated with weight gain and cognitive changes.  I therefore suggested she address this further with Zach Oakes PA-C at [facility].  In addition, a trial of Lidoderm patches over the foot dorsa is reasonable and I have sent in a prescription for this.      Finally, I explained that I am typically ambivalent about a skin biopsy for epidermal nerve fiber density in this clinical presentation because it can be abnormal in asymptomatic diabetics as well.  That said, however, Ms. Pompa is very interested in further documentation of her suspected peripheral nerve disorder and the procedure is quite safe.  With that in mind, we will arrange this.  Followup will be with Dr. Alexandre and Zach Oakes PA-C.  I would be happy to see her on an as-needed basis; however, as well.            Sincerely,      AYANNA CARNES MD             D: 2020   T: 2020   MT: PARKER      Name:     DANNY POMPA   MRN:      [id number]        Account:      [id number]   :      1955      Document: [id number]       cc: Daniel Oakes PA-C

## 2020-02-06 ENCOUNTER — TELEPHONE (OUTPATIENT)
Dept: NEUROLOGY | Facility: CLINIC | Age: 65
End: 2020-02-06

## 2020-02-06 NOTE — LETTER
2020      Regarding:  Joana Santana   1955        To whom it may concern,    Joana Santana is a patient of mine with a painful neuropathy in the context of diabetes, treated with metformin. I am appealing a denial of 5% lidocaine patch for pain control, as it appears that it should be covered according to your coverage criteria.        Sincerely,      Alan Webster MD

## 2020-02-06 NOTE — TELEPHONE ENCOUNTER
Prior Authorization Retail Medication Request    Medication/Dose: Lidocaine 5% patches  ICD code (if different than what is on RX):    Previously Tried and Failed:    Rationale:      Insurance Name:    Insurance ID:        Pharmacy Information (if different than what is on RX)  Name:    Phone:

## 2020-02-10 NOTE — TELEPHONE ENCOUNTER
Central Prior Authorization Team   Phone: [phone]    PA Initiation    Medication: Lidocaine 5% patches  Insurance Company: [employer] - Phone [phone] Fax [phone]  Pharmacy Filling the Rx[id number] [place] [place] [facility] [address] [facility] [facility]  Filling Pharmacy Phone: [phone]  Filling Pharmacy Fax:    Start Date: 2/10/2020

## 2020-02-11 NOTE — TELEPHONE ENCOUNTER
PRIOR AUTHORIZATION DENIED    Medication: Lidocaine 5% patches     Denial Date: 2/10/2020    Denial Rational:         Appeal Information:

## 2020-02-12 NOTE — TELEPHONE ENCOUNTER
The diagnosis on the prescription was Polyneuropathy. Dr Webster informed that she also has Diabetic Neuropathy. Tried resubmitting, but they will not let me resubmit as the case has already been denied. Will need to appeal, and in order to appeal will need a Letter of Medical Necessity please.

## 2020-02-13 ENCOUNTER — TELEPHONE (OUTPATIENT)
Dept: NEUROLOGY | Facility: CLINIC | Age: 65
End: 2020-02-13

## 2020-02-13 NOTE — TELEPHONE ENCOUNTER
"Pt states that she is having cramping and pain in her legs and is wondering if she should be seeing neurology or someone else.  Pt states that these are like \"mary horses\" It was recommended that patient do some stretches and keep hydrated to see if this helps.  Pt will come in for BX with Dr. Webster as schedule on 3/3/2020 and if she is still having issues she will schedule Follow-up with Dr. Alexandre.    Juana Connelly LPN  "

## 2020-02-13 NOTE — TELEPHONE ENCOUNTER
[facility] Call Center    Phone Message    May a detailed message be left on voicemail: yes     Reason for Call: Symptoms or Concerns     If patient has red-flag symptoms, warm transfer to triage line    Current symptom or concern: Leg Pain- Buring-heavy feeling.      Symptoms have been present for:  12 month    Has patient previously been seen for this? Yes    By Dr. Quentin Musa [place]    The patient's PCP advised the patient to see Dr. Alexandre regarding leg pain.    Are there any new or worsening symptoms?The patient stated the leg pain continues to get worse and it is keeping her awake at night.        Action Taken: Message routed to:  Adult Clinics: Neurology p [id number]    Travel Screening: Not Applicable

## 2020-02-17 NOTE — TELEPHONE ENCOUNTER
Medication Appeal Initiation    We have initiated an appeal for the requested medication:  Medication: Lidocaine 5% patches - P/A DENIED/APPEAL INITIATED  Appeal Start Date:  2/17/2020  Insurance Company: [employer] - Phone [phone] Fax [phone]  Comments:  Appeal has been initiated.  I have faxed original denial letter along with Letter of Medical Necessity (letters tab) to Pharmacy Appeals Dept fax# [phone].  Marked for urgent review.

## 2020-02-24 ENCOUNTER — HEALTH MAINTENANCE LETTER (OUTPATIENT)
Age: 65
End: 2020-02-24

## 2020-03-03 ENCOUNTER — OFFICE VISIT (OUTPATIENT)
Dept: NEUROLOGY | Facility: CLINIC | Age: 65
End: 2020-03-03
Payer: MEDICARE

## 2020-03-03 VITALS
SYSTOLIC BLOOD PRESSURE: 137 MMHG | WEIGHT: 232.1 LBS | DIASTOLIC BLOOD PRESSURE: 103 MMHG | OXYGEN SATURATION: 96 % | RESPIRATION RATE: 16 BRPM | TEMPERATURE: 97.4 F | HEART RATE: 83 BPM | BODY MASS INDEX: 36.35 KG/M2

## 2020-03-03 DIAGNOSIS — R20.9 SENSORY DISORDER: Primary | ICD-10-CM

## 2020-03-03 PROCEDURE — 88305 TISSUE EXAM BY PATHOLOGIST: CPT | Mod: 90 | Performed by: PSYCHIATRY & NEUROLOGY

## 2020-03-03 PROCEDURE — 88314 HISTOCHEMICAL STAINS ADD-ON: CPT | Mod: 90 | Performed by: PSYCHIATRY & NEUROLOGY

## 2020-03-03 PROCEDURE — 88356 ANALYSIS NERVE: CPT | Mod: 90 | Performed by: PSYCHIATRY & NEUROLOGY

## 2020-03-03 PROCEDURE — 99000 SPECIMEN HANDLING OFFICE-LAB: CPT | Performed by: PSYCHIATRY & NEUROLOGY

## 2020-03-03 PROCEDURE — 88348 ELECTRON MICROSCOPY DX: CPT | Mod: 90 | Performed by: PSYCHIATRY & NEUROLOGY

## 2020-03-03 PROCEDURE — 11105 PUNCH BX SKIN EA SEP/ADDL: CPT | Performed by: PSYCHIATRY & NEUROLOGY

## 2020-03-03 PROCEDURE — 88350 IMFLUOR EA ADDL 1ANTB STN PX: CPT | Mod: 90 | Performed by: PSYCHIATRY & NEUROLOGY

## 2020-03-03 PROCEDURE — 11104 PUNCH BX SKIN SINGLE LESION: CPT | Performed by: PSYCHIATRY & NEUROLOGY

## 2020-03-03 PROCEDURE — 88346 IMFLUOR 1ST 1ANTB STAIN PX: CPT | Mod: 90 | Performed by: PSYCHIATRY & NEUROLOGY

## 2020-03-03 RX ORDER — LEVOTHYROXINE SODIUM 50 UG/1
50 TABLET ORAL DAILY
COMMUNITY

## 2020-03-03 NOTE — PROGRESS NOTES
Procedure note: Skin biopsy. Indication: sensory disturbance, skin. After obtaining informed consent, 3 mm skin biopsies were performed over the right foot and calf. 1% lidocaine anesthesia and betadine sterile prep were used. The patient tolerated the procedure well. Wound care and patient education were provided by Mary Luna R.N.

## 2020-03-03 NOTE — NURSING NOTE
Joana Santana's goals for this visit include: New  She requests these members of her care team be copied on today's visit information:     PCP: Daniel Saavedra    Referring Provider:  Alan Webster MD  [address] [place], [place] [address] [address]    BP (!) 137/103   Pulse 83   Temp 97.4  F (36.3  C) (Oral)   Resp 16   Wt 105.3 kg (232 lb 1.6 oz)   SpO2 96%   BMI 36.35 kg/m      Do you need any medication refills at today's visit? No

## 2020-03-03 NOTE — LETTER
3/3/2020         RE: Joana Santana  [address] [place] [address]        Dear Colleague,    Thank you for referring your patient, Joana Santana, to the [facility]. Please see a copy of my visit note below.    Procedure note: Skin biopsy. Indication: sensory disturbance, skin. After obtaining informed consent, 3 mm skin biopsies were performed over the right foot and calf. 1% lidocaine anesthesia and betadine sterile prep were used. The patient tolerated the procedure well. Wound care and patient education were provided by Mary Luna R.N.    Again, thank you for allowing me to participate in the care of your patient.        Sincerely,        Alan Webster MD

## 2020-03-27 LAB — LAB SCANNED RESULT: NORMAL

## 2020-04-13 ENCOUNTER — TELEPHONE (OUTPATIENT)
Dept: NEUROLOGY | Facility: CLINIC | Age: 65
End: 2020-04-13

## 2020-04-13 NOTE — TELEPHONE ENCOUNTER
[facility] Call Center    Phone Message    May a detailed message be left on voicemail: yes     Reason for Call: Requesting Results   Name/type of test: Lab Results  Date of test: 3/3/2020  Was test done at a location other than [facility] (Please fill in the location if not [facility])?: No  Please advise. Thank you.    Action Taken: Message routed to:  Adult Clinics: Neurology p [id number]    Travel Screening: Not Applicable

## 2020-04-14 NOTE — TELEPHONE ENCOUNTER
The pt was informed of Dr Webster's message below. She would like to discuss this further with Dr Alexandre so a video visit was arranged for tomorrow afternoon.   Message sent to the [facility] to complete the rooming process and assist with setting up the michele on her phone.  Mary Luna, RNCC  Neurology

## 2020-04-14 NOTE — TELEPHONE ENCOUNTER
Notes recorded by Alan Webster MD on 3/30/2020 at 2:11 PM CDT   Skin biopsy confirms the clinical diagnosis of small fiber neuropathy. Recommendations are good blood sugar control and continued pain management. If she is interested in neurological followup she is welcome to follow up with me or Dr Alexandre.

## 2020-04-15 ENCOUNTER — VIRTUAL VISIT (OUTPATIENT)
Dept: NEUROLOGY | Facility: CLINIC | Age: 65
End: 2020-04-15
Payer: MEDICARE

## 2020-04-15 DIAGNOSIS — G62.9 NEUROPATHY: Primary | ICD-10-CM

## 2020-04-15 PROCEDURE — 99442 ZZC PHYSICIAN TELEPHONE EVALUATION 11-20 MIN: CPT | Performed by: PSYCHIATRY & NEUROLOGY

## 2020-04-15 RX ORDER — NORTRIPTYLINE HCL 10 MG
10 CAPSULE ORAL SEE ADMIN INSTRUCTIONS
Qty: 60 CAPSULE | Refills: 1 | Status: SHIPPED | OUTPATIENT
Start: 2020-04-15 | End: 2020-05-19

## 2020-04-15 NOTE — PROGRESS NOTES
Visit Date:   04/15/2020      This is a telephone followup visit because of the viral epidemic.  Call initiated time:  12:59.  Terminated time:  1:16.        HISTORY OF PRESENT ILLNESS:  This patient is having a followup visit because of symptoms of neuropathy.  I have seen her on several occasions in the last year or so.  My most recent contact with her was in November.  At that time she was recovering from the low back surgery with lumbar polyradiculopathy.  She was on oxycodone for pain management.  She had been on trazodone and probably amitriptyline in the past to help with sleep.  She does have a history of sleep apnea.  I was seeing her for sensory disturbance and pain in the feet.  Her presentation was most suggestive of small fiber neuropathy.  Her workup in that regard was nondiagnostic.  She did see Dr. Webster for a second opinion.  He obtained a skin biopsy.  I have reviewed those results with the patient.  The study was abnormal.  The patient had a marked reduction in epidermal innervation in the foot with nerve fiber density in the calf at the 5th percentile.      The patient has been on gabapentin, but has discontinued it because it was ineffective.  She has also been on pregabalin, which likewise was ineffective.  She does have cramping in the calves.  She has been going to physical therapy, but that has been ineffective and unhelpful.  She has not been taking magnesium for cramping in the calves.  She is on 6 oxycodone daily, but that is really not helping her pain symptoms much.  She is eating well.  She does use Lidoderm patches and they offer some relief.  She is on Duloxetine 60 mg daily for symptoms of pain and also depression, although this medicine is not completely effective in that regard.  One of her main concern is how to manage the pain.  She does have a condition of glucose intolerance but says she has never been diagnosed with diabetes.  In reviewing some of her lab work, she has had  quite elevated glucose levels up to 240, last fall when she was in the hospital for her back surgery.  She also has had bouts of steroids and that may have contributed to some of those high glucose levels.  However, she is not on steroids now.  I did review her medication list with her.      There is no examination as this is a telephone visit.      ASSESSMENT:   1.  Sensory disturbance and pain in the feet, small fiber neuropathy.   2.  History of lumbar spine surgery.   3.  Chronic pain issues.      DISCUSSION:  The patient is seen with the above problem list.  The main issue at this point is her ongoing pain, especially in the feet.  For treatment, I am going to try her on nortriptyline 10 mg nightly and after 10 days, going to 20 mg nightly.  Side effects were reviewed, including heart palpitations.  If she has any difficulty with the medicine, she needs to stop it and contact me and she is aware of that.  It should not cause a significant interaction at the duloxetine at this low dose.  She will be discussing ongoing management with oxycodone with the pain doctor.  It is my recommendation that she try and limit her use of habit forming medicines as much as possible.  I would like to have her follow up in clinic in 4-6 weeks, or sooner if there are problems.  She knows to call if she has problems with the nortriptyline.      This was a telephone followup visit.  Call initiated time:  12:59.  Terminated time:  1:16.         JYOTHI HARRIS MD             D: 04/15/2020   T: 04/15/2020   MT: DORIE      Name:     DANNY POMPA   MRN:      [id number]        Account:      [id number]   :      1955           Visit Date:   04/15/2020      Document: [id number]

## 2020-04-15 NOTE — NURSING NOTE
Joana Santana's goals for this visit include: Return  She requests these members of her care team be copied on today's visit information:     PCP: Daniel Saavedra    Referring Provider:  No referring provider defined for this encounter.    There were no vitals taken for this visit.    Do you need any medication refills at today's visit? No

## 2020-04-15 NOTE — PROGRESS NOTES
"Joana Santana is a 65 year old female who is being evaluated via a billable video visit.      The patient has been notified of following:     \"This video visit will be conducted via a call between you and your physician/provider. We have found that certain health care needs can be provided without the need for an in-person physical exam.  This service lets us provide the care you need with a video conversation.  If a prescription is necessary we can send it directly to your pharmacy.  If lab work is needed we can place an order for that and you can then stop by our lab to have the test done at a later time.    Video visits are billed at different rates depending on your insurance coverage.  Please reach out to your insurance provider with any questions.    If during the course of the call the physician/provider feels a video visit is not appropriate, you will not be charged for this service.\"    Patient has given verbal consent for Video visit? Yes    How would you like to obtain your AVS? Leonardo    Patient would like the video invitation sent by: Text to cell phone: [phone]        "

## 2020-05-19 ENCOUNTER — VIRTUAL VISIT (OUTPATIENT)
Dept: NEUROLOGY | Facility: CLINIC | Age: 65
End: 2020-05-19
Payer: MEDICARE

## 2020-05-19 DIAGNOSIS — G62.9 NEUROPATHY: Primary | ICD-10-CM

## 2020-05-19 PROCEDURE — 99443: CPT | Performed by: PSYCHIATRY & NEUROLOGY

## 2020-05-19 RX ORDER — DULOXETIN HYDROCHLORIDE 30 MG/1
30 CAPSULE, DELAYED RELEASE ORAL DAILY
Qty: 30 CAPSULE | Refills: 1 | Status: SHIPPED | OUTPATIENT
Start: 2020-05-19

## 2020-05-19 NOTE — PROGRESS NOTES
"Joana Santana is a 65 year old female who is being evaluated via a billable telephone visit.      The patient has been notified of following:     \"This telephone visit will be conducted via a call between you and your physician/provider. We have found that certain health care needs can be provided without the need for a physical exam.  This service lets us provide the care you need with a short phone conversation.  If a prescription is necessary we can send it directly to your pharmacy.  If lab work is needed we can place an order for that and you can then stop by our lab to have the test done at a later time.    Telephone visits are billed at different rates depending on your insurance coverage. During this emergency period, for some insurers they may be billed the same as an in-person visit.  Please reach out to your insurance provider with any questions.    If during the course of the call the physician/provider feels a telephone visit is not appropriate, you will not be charged for this service.\"    Patient has given verbal consent for Telephone visit?  Yes    What phone number would you like to be contacted at? [phone]    How would you like to obtain your AVS? [facility]    Phone call duration: 25 minutes    Noble Alexandre MD    "

## 2020-05-20 NOTE — PROGRESS NOTES
Visit Date:   05/19/2020      This is a telephone followup visit because of the viral epidemic.        CALL INITIATED TIME:  3:15.        CALL TERMINATED TIME:  3:40.        The patient was at home.      I have seen this patient for painful small-fiber neuropathy.  Her last visit was in 04/2020, and that also was a virtual visit.  At that time she was struggling with pain symptoms.  She was on duloxetine 60 mg daily without much benefit with regard to the pain.  She takes it mostly for depression.  I had put her on a low dose of nortriptyline.  She never took it because of her concerns about possible side effects and interactions with duloxetine.  She continues to have painful feet.  They also tingle.  The intensity varies.  She feels like she is stuck in a rut.  She is nervous overall about taking medications.  She has been going to [facility] Oakes [facility] [place] [facility]  Her regimen includes a lidocaine patch to the feet.  She does not use them every day and mainly uses them at night.  The lidocaine patches tend to fall off when she is in bed.  I advised her to use paper tape to try and hold them on.  She also is on oxycodone 4-5 tablets a day, and she takes more of it if she is active, because that seems to stir up her symptoms.  She is on duloxetine 60 mg.  She takes metformin for glucose intolerance.      She says her mood is depressed and somewhat anxious.  Her sleep is poor.  She did see a sleep specialist, who prescribed ropinirole.      There is no exam, as this is a telephone followup.  The patient had numerous questions about the diagnosis of small-fiber neuropathy, and all these were answered.      ASSESSMENT:   1.  Painful small-fiber neuropathy.   2.  History of lumbar spine surgery.   3.  Chronic pain issues.      DISCUSSION:  The patient had a telephone followup visit with the above problem list.  At this point, I told her to forget about a trial of nortriptyline.  Instead, we will  increase the dose of duloxetine to 90 mg daily to see if that might benefit her.  I will give her a new prescription to that end.  All her questions were answered.  I would not propose any further workup for her symptoms.  I am going to see her in clinic in 4-6 weeks for reexamination.  I encouraged her to call if there are questions or problems before then.      This was a 25-minute appointment, more than half of which was spent in counseling and coordination of care.         JYOTHI HARRIS MD             D: 2020   T: 2020   MT: TOM      Name:     DANNY POMPA   MRN:      [id number]        Account:      [id number]   :      1955           Visit Date:   2020      Document: [id number]

## 2020-12-13 ENCOUNTER — HEALTH MAINTENANCE LETTER (OUTPATIENT)
Age: 65
End: 2020-12-13

## 2021-04-17 ENCOUNTER — HEALTH MAINTENANCE LETTER (OUTPATIENT)
Age: 66
End: 2021-04-17

## 2021-09-26 ENCOUNTER — HEALTH MAINTENANCE LETTER (OUTPATIENT)
Age: 66
End: 2021-09-26

## 2021-12-31 ENCOUNTER — TELEPHONE (OUTPATIENT)
Dept: NEUROSURGERY | Facility: CLINIC | Age: 66
End: 2021-12-31
Payer: MEDICARE

## 2021-12-31 NOTE — TELEPHONE ENCOUNTER
[facility] Call Center    Phone Message    May a detailed message be left on voicemail: yes     Reason for Call: Other: Patient is requesting appointment with Dr. Templeton. Not able to schedule     Action Taken: Message routed to:  [facility] Neurosurgery    Travel Screening: Not Applicable

## 2022-01-05 NOTE — TELEPHONE ENCOUNTER
SPINE PATIENTS - NEW PROTOCOL PREVISIT    RECORDS RECEIVED FROM: Self   REASON FOR VISIT: SI joint issues, back issues (herniated discs, scoliosis   Date of Appt: 1/20/22   NOTES (FOR ALL VISITS) STATUS DETAILS   OFFICE NOTE from referring provider N/A    OFFICE NOTE from other specialist [facility] Dr Noble Perales @ [facility]:  1/3/22  11/19/21  9/28/21      Dr Elia Vang @ [facility]:  6/8/21  5/18/21  3/11/21  2/9/21    Dr Behzad Sabit @ [facility]:  2/10/21    Sarah RAM @ [facility]:  1/8/21    Shahram Loya @ [facility]:  12/22/20 11/2/20 9/30/20 6/5/20    Dr Alan Menchaca @ [facility]:  9/25/19   DISCHARGE SUMMARY from hospital [facility]:  2/26/21-3/2/21   DISCHARGE REPORT from ER [facility]:  3/1/21  Bone Lesion Bx    [facility]  10/18/19  ANTERIOR FUSION L4-S1 SUPINE THEN PRONE POSITION   EMG REPORT Internal [facility]:  9/16/19   MEDICATION LIST Internal    IMAGING  (FOR ALL VISITS)     MRI (HEAD, NECK, SPINE) Received [facility]:  MRI Thoracic and Lumbar Spine 12/29/21   XRAY (SPINE) *NEUROSURGERY* Received [facility]:  XR Lumbar Spine 12/28/21   CT (HEAD, NECK, SPINE) N/A       Action 1/5/22 MV 1.35pm   Action Taken Imaging request faxed to [facility]    --1/17/22 MV 8.35am--  Images resolved in PACS

## 2022-01-19 DIAGNOSIS — M54.9 BACK PAIN: Primary | ICD-10-CM

## 2022-01-20 ENCOUNTER — OFFICE VISIT (OUTPATIENT)
Dept: NEUROSURGERY | Facility: CLINIC | Age: 67
End: 2022-01-20
Payer: MEDICARE

## 2022-01-20 ENCOUNTER — PRE VISIT (OUTPATIENT)
Dept: NEUROSURGERY | Facility: CLINIC | Age: 67
End: 2022-01-20

## 2022-01-20 ENCOUNTER — ANCILLARY PROCEDURE (OUTPATIENT)
Dept: GENERAL RADIOLOGY | Facility: CLINIC | Age: 67
End: 2022-01-20
Attending: NEUROLOGICAL SURGERY
Payer: MEDICARE

## 2022-01-20 VITALS
SYSTOLIC BLOOD PRESSURE: 135 MMHG | DIASTOLIC BLOOD PRESSURE: 81 MMHG | HEART RATE: 95 BPM | BODY MASS INDEX: 31.39 KG/M2 | HEIGHT: 67 IN | OXYGEN SATURATION: 98 % | WEIGHT: 200 LBS

## 2022-01-20 DIAGNOSIS — M54.9 BACK PAIN: ICD-10-CM

## 2022-01-20 DIAGNOSIS — M53.3 SACROILIAC JOINT DYSFUNCTION OF RIGHT SIDE: Primary | ICD-10-CM

## 2022-01-20 PROCEDURE — 72082 X-RAY EXAM ENTIRE SPI 2/3 VW: CPT | Performed by: STUDENT IN AN ORGANIZED HEALTH CARE EDUCATION/TRAINING PROGRAM

## 2022-01-20 PROCEDURE — 99204 OFFICE O/P NEW MOD 45 MIN: CPT | Performed by: NEUROLOGICAL SURGERY

## 2022-01-20 PROCEDURE — 77073 BONE LENGTH STUDIES: CPT | Performed by: STUDENT IN AN ORGANIZED HEALTH CARE EDUCATION/TRAINING PROGRAM

## 2022-01-20 RX ORDER — SPIRONOLACTONE 50 MG/1
1 TABLET, FILM COATED ORAL DAILY
COMMUNITY
Start: 2021-05-24

## 2022-01-20 RX ORDER — ACETAMINOPHEN 500 MG
1000 TABLET ORAL EVERY 6 HOURS PRN
COMMUNITY
Start: 2021-07-04

## 2022-01-20 RX ORDER — ONDANSETRON 4 MG/1
4 TABLET, ORALLY DISINTEGRATING ORAL EVERY 4 HOURS PRN
COMMUNITY
Start: 2021-09-20

## 2022-01-20 RX ORDER — MORPHINE SULFATE 15 MG/1
15 TABLET, FILM COATED, EXTENDED RELEASE ORAL 2 TIMES DAILY
COMMUNITY
Start: 2021-01-17

## 2022-01-20 RX ORDER — LEMBOREXANT 5 MG/1
1 TABLET, FILM COATED ORAL AT BEDTIME
COMMUNITY
Start: 2021-04-23

## 2022-01-20 RX ORDER — ETODOLAC 300 MG
300 CAPSULE ORAL DAILY
COMMUNITY
Start: 2021-11-17

## 2022-01-20 RX ORDER — NALOXONE HYDROCHLORIDE 4 MG/.1ML
1 SPRAY NASAL SEE ADMIN INSTRUCTIONS
COMMUNITY
Start: 2021-05-28

## 2022-01-20 RX ORDER — CELECOXIB 200 MG/1
1 CAPSULE ORAL DAILY
COMMUNITY
Start: 2021-07-04

## 2022-01-20 RX ORDER — BUSPIRONE HYDROCHLORIDE 5 MG/1
5 TABLET ORAL 4 TIMES DAILY PRN
COMMUNITY
Start: 2020-09-23

## 2022-01-20 RX ORDER — POLYETHYLENE GLYCOL 3350 17 G/17G
17 POWDER, FOR SOLUTION ORAL DAILY PRN
COMMUNITY
Start: 2021-02-05

## 2022-01-20 RX ORDER — CARISOPRODOL 350 MG/1
1 TABLET ORAL DAILY PRN
COMMUNITY
Start: 2021-02-17

## 2022-01-20 RX ORDER — METHOCARBAMOL 500 MG/1
500 TABLET, FILM COATED ORAL DAILY PRN
COMMUNITY
Start: 2021-05-18

## 2022-01-20 RX ORDER — PANTOPRAZOLE SODIUM 40 MG/1
40 TABLET, DELAYED RELEASE ORAL DAILY
COMMUNITY

## 2022-01-20 RX ORDER — LOPERAMIDE HCL 2 MG
2 CAPSULE ORAL DAILY PRN
COMMUNITY
Start: 2021-08-12

## 2022-01-20 ASSESSMENT — MIFFLIN-ST. JEOR: SCORE: 1489.82
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
## 2022-01-20 NOTE — PATIENT INSTRUCTIONS
Dr. Templeton ordered physical therapy for your right sacroiliac joint. A copy of this referral will be mailed to your home address. You will need to find a local facility to do the physical therapy. No follow-up needed with Dr. Templeton.
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
## 2022-01-20 NOTE — LETTER
Date:January 26, 2022      Patient was self referred, no letter generated. Do not send.        [facility] Health Information

## 2022-03-13 ENCOUNTER — HEALTH MAINTENANCE LETTER (OUTPATIENT)
Age: 67
End: 2022-03-13

## 2023-04-23 ENCOUNTER — HEALTH MAINTENANCE LETTER (OUTPATIENT)
Age: 68
End: 2023-04-23

## 2024-04-21 ENCOUNTER — HEALTH MAINTENANCE LETTER (OUTPATIENT)
Age: 69
End: 2024-04-21

## 2024-06-30 ENCOUNTER — HEALTH MAINTENANCE LETTER (OUTPATIENT)
Age: 69
End: 2024-06-30

## (undated) DEVICE — ESU GROUND PAD ADULT W/CORD E7507

## (undated) DEVICE — SOL NACL 0.9% IRRIG 500ML BOTTLE 2F7123

## (undated) DEVICE — SU SILK 2-0 TIE 12X30" A305H

## (undated) DEVICE — SU MONOCRYL 5-0 P-3 18" UND Y493G

## (undated) DEVICE — PREP CHLORAPREP W/ORANGE TINT 10.5ML 260715

## (undated) DEVICE — SU CHROMIC 3-0 SH 27" G122H

## (undated) DEVICE — PREP PAD ALCOHOL 6818

## (undated) DEVICE — ESU PENCIL SMOKE EVAC W/ROCKER SWITCH 0703-047-000

## (undated) DEVICE — ADHESIVE SWIFTSET 0.8ML OCTYL SS6

## (undated) DEVICE — SU SILK 3-0 TIE 12X30" A304H

## (undated) DEVICE — CLIP HORIZON MED BLUE 002200

## (undated) DEVICE — SPONGE KITTNER 30-101

## (undated) DEVICE — GOWN LG DISP 9515

## (undated) DEVICE — SUCTION SLEEVE NEPTUNE 2 125MM 0703-005-125

## (undated) DEVICE — ESU LIGASURE OPEN SEALER/DIVIDER SM JAW 16.5MM LF1212A

## (undated) DEVICE — SUCTION MANIFOLD NEPTUNE 2 SYS 4 PORT 0702-020-000

## (undated) DEVICE — ESU ELEC BLADE 2.75" COATED/INSULATED E1455

## (undated) DEVICE — SPECIMEN CONTAINER W/10% BUFFERED FORMALIN 120ML 591201

## (undated) DEVICE — PACK ENT MINOR CUSTOM ASC

## (undated) DEVICE — SURGICEL FIBRILLAR HEMOSTAT 1"X2" 1961

## (undated) DEVICE — DRSG TEGADERM 2 3/8X2 3/4" 1624W

## (undated) DEVICE — NIM PROBE PRASS INCREMENTING TIP 8225825

## (undated) DEVICE — LINEN TOWEL PACK X5 5464

## (undated) DEVICE — TUBE ENDOTRACHEAL NIM TRIVANTAGE 6.0MM 8229706

## (undated) DEVICE — CLIP HORIZON SM RED WIDE SLOT 001201

## (undated) DEVICE — GLOVE PROTEXIS W/NEU-THERA 6.5  2D73TE65

## (undated) RX ORDER — HYDROMORPHONE HYDROCHLORIDE 1 MG/ML
INJECTION, SOLUTION INTRAMUSCULAR; INTRAVENOUS; SUBCUTANEOUS
Status: DISPENSED
Start: 2018-10-02

## (undated) RX ORDER — ONDANSETRON 2 MG/ML
INJECTION INTRAMUSCULAR; INTRAVENOUS
Status: DISPENSED
Start: 2018-10-02

## (undated) RX ORDER — EPHEDRINE SULFATE 50 MG/ML
INJECTION, SOLUTION INTRAMUSCULAR; INTRAVENOUS; SUBCUTANEOUS
Status: DISPENSED
Start: 2018-10-02

## (undated) RX ORDER — LIDOCAINE HYDROCHLORIDE 10 MG/ML
INJECTION, SOLUTION EPIDURAL; INFILTRATION; INTRACAUDAL; PERINEURAL
Status: DISPENSED
Start: 2018-10-02

## (undated) RX ORDER — OXYCODONE HYDROCHLORIDE 5 MG/1
TABLET ORAL
Status: DISPENSED
Start: 2018-10-02

## (undated) RX ORDER — ACETAMINOPHEN 325 MG/1
TABLET ORAL
Status: DISPENSED
Start: 2018-10-02

## (undated) RX ORDER — GABAPENTIN 300 MG/1
CAPSULE ORAL
Status: DISPENSED
Start: 2018-10-02

## (undated) RX ORDER — DEXAMETHASONE SODIUM PHOSPHATE 4 MG/ML
INJECTION, SOLUTION INTRA-ARTICULAR; INTRALESIONAL; INTRAMUSCULAR; INTRAVENOUS; SOFT TISSUE
Status: DISPENSED
Start: 2018-10-02

## (undated) RX ORDER — REMIFENTANIL HYDROCHLORIDE 1 MG/ML
INJECTION, POWDER, LYOPHILIZED, FOR SOLUTION INTRAVENOUS
Status: DISPENSED
Start: 2018-10-02